# Patient Record
Sex: FEMALE | Race: WHITE | NOT HISPANIC OR LATINO | Employment: FULL TIME | URBAN - METROPOLITAN AREA
[De-identification: names, ages, dates, MRNs, and addresses within clinical notes are randomized per-mention and may not be internally consistent; named-entity substitution may affect disease eponyms.]

---

## 2023-08-30 ENCOUNTER — LAB (OUTPATIENT)
Dept: LAB | Facility: LAB | Age: 63
End: 2023-08-30
Payer: COMMERCIAL

## 2023-08-30 ENCOUNTER — OFFICE VISIT (OUTPATIENT)
Dept: PRIMARY CARE | Facility: CLINIC | Age: 63
End: 2023-08-30
Payer: COMMERCIAL

## 2023-08-30 VITALS
WEIGHT: 183.25 LBS | TEMPERATURE: 97.8 F | HEART RATE: 66 BPM | SYSTOLIC BLOOD PRESSURE: 138 MMHG | BODY MASS INDEX: 29.8 KG/M2 | DIASTOLIC BLOOD PRESSURE: 85 MMHG

## 2023-08-30 DIAGNOSIS — Z11.59 ENCOUNTER FOR HEPATITIS C SCREENING TEST FOR LOW RISK PATIENT: ICD-10-CM

## 2023-08-30 DIAGNOSIS — R91.1 PULMONARY NODULE: ICD-10-CM

## 2023-08-30 DIAGNOSIS — E78.49 OTHER HYPERLIPIDEMIA: ICD-10-CM

## 2023-08-30 DIAGNOSIS — I10 PRIMARY HYPERTENSION: ICD-10-CM

## 2023-08-30 DIAGNOSIS — Z00.00 ENCOUNTER FOR PREVENTATIVE ADULT HEALTH CARE EXAMINATION: Primary | ICD-10-CM

## 2023-08-30 DIAGNOSIS — K21.9 GASTROESOPHAGEAL REFLUX DISEASE WITHOUT ESOPHAGITIS: ICD-10-CM

## 2023-08-30 DIAGNOSIS — Z00.00 ENCOUNTER FOR PREVENTATIVE ADULT HEALTH CARE EXAMINATION: ICD-10-CM

## 2023-08-30 PROBLEM — E78.00 HYPERCHOLESTEREMIA: Status: ACTIVE | Noted: 2023-08-30

## 2023-08-30 LAB
ALANINE AMINOTRANSFERASE (SGPT) (U/L) IN SER/PLAS: 23 U/L (ref 7–45)
ALBUMIN (G/DL) IN SER/PLAS: 4.3 G/DL (ref 3.4–5)
ALKALINE PHOSPHATASE (U/L) IN SER/PLAS: 134 U/L (ref 33–136)
ANION GAP IN SER/PLAS: 13 MMOL/L (ref 10–20)
APPEARANCE, URINE: CLEAR
ASPARTATE AMINOTRANSFERASE (SGOT) (U/L) IN SER/PLAS: 19 U/L (ref 9–39)
BACTERIA, URINE: ABNORMAL /HPF
BASOPHILS (10*3/UL) IN BLOOD BY AUTOMATED COUNT: 0.06 X10E9/L (ref 0–0.1)
BASOPHILS/100 LEUKOCYTES IN BLOOD BY AUTOMATED COUNT: 0.5 % (ref 0–2)
BILIRUBIN TOTAL (MG/DL) IN SER/PLAS: 0.9 MG/DL (ref 0–1.2)
BILIRUBIN, URINE: NEGATIVE
BLOOD, URINE: NEGATIVE
CALCIDIOL (25 OH VITAMIN D3) (NG/ML) IN SER/PLAS: 27 NG/ML
CALCIUM (MG/DL) IN SER/PLAS: 9.3 MG/DL (ref 8.6–10.6)
CARBON DIOXIDE, TOTAL (MMOL/L) IN SER/PLAS: 29 MMOL/L (ref 21–32)
CHLORIDE (MMOL/L) IN SER/PLAS: 104 MMOL/L (ref 98–107)
CHOLESTEROL (MG/DL) IN SER/PLAS: 242 MG/DL (ref 0–199)
CHOLESTEROL IN HDL (MG/DL) IN SER/PLAS: 51.1 MG/DL
CHOLESTEROL/HDL RATIO: 4.7
COLOR, URINE: YELLOW
CREATININE (MG/DL) IN SER/PLAS: 0.72 MG/DL (ref 0.5–1.05)
EOSINOPHILS (10*3/UL) IN BLOOD BY AUTOMATED COUNT: 0.12 X10E9/L (ref 0–0.7)
EOSINOPHILS/100 LEUKOCYTES IN BLOOD BY AUTOMATED COUNT: 1 % (ref 0–6)
ERYTHROCYTE DISTRIBUTION WIDTH (RATIO) BY AUTOMATED COUNT: 12.2 % (ref 11.5–14.5)
ERYTHROCYTE MEAN CORPUSCULAR HEMOGLOBIN CONCENTRATION (G/DL) BY AUTOMATED: 32.6 G/DL (ref 32–36)
ERYTHROCYTE MEAN CORPUSCULAR VOLUME (FL) BY AUTOMATED COUNT: 100 FL (ref 80–100)
ERYTHROCYTES (10*6/UL) IN BLOOD BY AUTOMATED COUNT: 4.53 X10E12/L (ref 4–5.2)
ESTIMATED AVERAGE GLUCOSE FOR HBA1C: 108 MG/DL
GFR FEMALE: >90 ML/MIN/1.73M2
GLUCOSE (MG/DL) IN SER/PLAS: 92 MG/DL (ref 74–99)
GLUCOSE, URINE: NEGATIVE MG/DL
HEMATOCRIT (%) IN BLOOD BY AUTOMATED COUNT: 45.4 % (ref 36–46)
HEMOGLOBIN (G/DL) IN BLOOD: 14.8 G/DL (ref 12–16)
HEMOGLOBIN A1C/HEMOGLOBIN TOTAL IN BLOOD: 5.4 %
HEPATITIS C VIRUS AB PRESENCE IN SERUM: NONREACTIVE
IMMATURE GRANULOCYTES/100 LEUKOCYTES IN BLOOD BY AUTOMATED COUNT: 0.1 % (ref 0–0.9)
KETONES, URINE: NEGATIVE MG/DL
LDL: 153 MG/DL (ref 0–99)
LEUKOCYTE ESTERASE, URINE: ABNORMAL
LEUKOCYTES (10*3/UL) IN BLOOD BY AUTOMATED COUNT: 12.6 X10E9/L (ref 4.4–11.3)
LYMPHOCYTES (10*3/UL) IN BLOOD BY AUTOMATED COUNT: 9.21 X10E9/L (ref 1.2–4.8)
LYMPHOCYTES/100 LEUKOCYTES IN BLOOD BY AUTOMATED COUNT: 73.1 % (ref 13–44)
MONOCYTES (10*3/UL) IN BLOOD BY AUTOMATED COUNT: 0.41 X10E9/L (ref 0.1–1)
MONOCYTES/100 LEUKOCYTES IN BLOOD BY AUTOMATED COUNT: 3.3 % (ref 2–10)
MUCUS, URINE: ABNORMAL /LPF
NEUTROPHILS (10*3/UL) IN BLOOD BY AUTOMATED COUNT: 2.79 X10E9/L (ref 1.2–7.7)
NEUTROPHILS/100 LEUKOCYTES IN BLOOD BY AUTOMATED COUNT: 22 % (ref 40–80)
NITRITE, URINE: NEGATIVE
NRBC (PER 100 WBCS) BY AUTOMATED COUNT: 0.2 /100 WBC (ref 0–0)
PH, URINE: 8 (ref 5–8)
PLATELETS (10*3/UL) IN BLOOD AUTOMATED COUNT: 273 X10E9/L (ref 150–450)
POTASSIUM (MMOL/L) IN SER/PLAS: 4.1 MMOL/L (ref 3.5–5.3)
PROTEIN TOTAL: 6.5 G/DL (ref 6.4–8.2)
PROTEIN, URINE: NEGATIVE MG/DL
RBC, URINE: 1 /HPF (ref 0–5)
SODIUM (MMOL/L) IN SER/PLAS: 142 MMOL/L (ref 136–145)
SPECIFIC GRAVITY, URINE: 1.01 (ref 1–1.03)
SQUAMOUS EPITHELIAL CELLS, URINE: 1 /HPF
THYROTROPIN (MIU/L) IN SER/PLAS BY DETECTION LIMIT <= 0.05 MIU/L: 1.76 MIU/L (ref 0.44–3.98)
TRIGLYCERIDE (MG/DL) IN SER/PLAS: 190 MG/DL (ref 0–149)
UREA NITROGEN (MG/DL) IN SER/PLAS: 10 MG/DL (ref 6–23)
UROBILINOGEN, URINE: <2 MG/DL (ref 0–1.9)
VLDL: 38 MG/DL (ref 0–40)
WBC, URINE: 1 /HPF (ref 0–5)

## 2023-08-30 PROCEDURE — 36415 COLL VENOUS BLD VENIPUNCTURE: CPT

## 2023-08-30 PROCEDURE — 80053 COMPREHEN METABOLIC PANEL: CPT

## 2023-08-30 PROCEDURE — 84443 ASSAY THYROID STIM HORMONE: CPT

## 2023-08-30 PROCEDURE — 80061 LIPID PANEL: CPT

## 2023-08-30 PROCEDURE — 83036 HEMOGLOBIN GLYCOSYLATED A1C: CPT

## 2023-08-30 PROCEDURE — 83695 ASSAY OF LIPOPROTEIN(A): CPT

## 2023-08-30 PROCEDURE — 3075F SYST BP GE 130 - 139MM HG: CPT | Performed by: INTERNAL MEDICINE

## 2023-08-30 PROCEDURE — 3079F DIAST BP 80-89 MM HG: CPT | Performed by: INTERNAL MEDICINE

## 2023-08-30 PROCEDURE — 1036F TOBACCO NON-USER: CPT | Performed by: INTERNAL MEDICINE

## 2023-08-30 PROCEDURE — 82306 VITAMIN D 25 HYDROXY: CPT

## 2023-08-30 PROCEDURE — 85025 COMPLETE CBC W/AUTO DIFF WBC: CPT

## 2023-08-30 PROCEDURE — 86803 HEPATITIS C AB TEST: CPT

## 2023-08-30 PROCEDURE — 99396 PREV VISIT EST AGE 40-64: CPT | Performed by: INTERNAL MEDICINE

## 2023-08-30 PROCEDURE — 81001 URINALYSIS AUTO W/SCOPE: CPT

## 2023-08-30 PROCEDURE — 82172 ASSAY OF APOLIPOPROTEIN: CPT

## 2023-08-30 RX ORDER — CHOLECALCIFEROL (VITAMIN D3) 25 MCG
TABLET ORAL
COMMUNITY
End: 2023-09-02 | Stop reason: WASHOUT

## 2023-08-30 RX ORDER — OLMESARTAN MEDOXOMIL 20 MG/1
TABLET ORAL
COMMUNITY
Start: 2023-06-15 | End: 2023-09-06 | Stop reason: SDUPTHER

## 2023-08-30 RX ORDER — FAMOTIDINE 40 MG/1
40 TABLET, FILM COATED ORAL DAILY
COMMUNITY
End: 2023-09-02 | Stop reason: WASHOUT

## 2023-08-30 RX ORDER — MAGNESIUM 200 MG
TABLET ORAL
COMMUNITY
End: 2023-09-02 | Stop reason: WASHOUT

## 2023-08-30 RX ORDER — TRIAMCINOLONE ACETONIDE 1 MG/G
CREAM TOPICAL
COMMUNITY
Start: 2016-04-04 | End: 2023-09-02 | Stop reason: WASHOUT

## 2023-08-30 ASSESSMENT — PATIENT HEALTH QUESTIONNAIRE - PHQ9
SUM OF ALL RESPONSES TO PHQ9 QUESTIONS 1 & 2: 0
2. FEELING DOWN, DEPRESSED OR HOPELESS: NOT AT ALL
1. LITTLE INTEREST OR PLEASURE IN DOING THINGS: NOT AT ALL

## 2023-08-30 NOTE — PROGRESS NOTES
Subjective     Patient ID: Priya Izaguirre is a 63 y.o. female who presents for NEW PT VISIT.  HPI  63-year-old female new here for establishment of care and preventive care visit former patient of Dr. Akbar last seen in August of last year.     - Has been experiencing unexplained weight gain despite healthy lifestyle and no significant changes. Baseline weight around 170 pounds and has been gradually creeping up, she does fluctuate intermittently. At her lowest she was 158 pounds      Past medical history  -HTN -on olmesartan 20, previously on thiazide intolerant.   -HLD - CAC of 2 in 2019   -GERD- had EGD performed in December found hiatal hernia treated symptomatically   -Pulmonary nodules-completed 2-year follow-up  - Uterine fibroids - history of myomectomy 2001 and then grew back upcoming appt scheduled with GYN today   - Right meniscus repair in 2011 with mild weakness on the right   - Left congenital hearing loss - gets tested every 10 years declined hearing aid.     Social;   - Lives at home alone with dog.   - 3 sisters and a brother  - No tobacco, alcohol or drugs  - Works from home since 2001 does  - previously for [employer] now a different company    Lifestyle   - Diet - strict vegetarian since 2006 not too much junk foods, stays away from highly processed foods. Salads, no sodas or juices   - Exercise - [employer] [facility], did [facility] her spin classes.   - Sleep-regular uninterrupted 8 hours at least      Review of Systems:   General: No constitutional symptoms, weight changes as described  HEENT: No headaches, changes in vision or hearing, no sinus or dental issues   Cardiac: No chest pain, palpitations, dyspnea on exertion   Lungs: No cough, wheezing, shortness of breath   Abdomen: No abdominal pain, nausea/vomiting, diarrhea or constipation   : No urinary complaints   Musculoskeletal: no joint pains, swelling or tenderness   Heme: No bleeding or thrombosis issues   Lymph:  No swollen lymph glands   Skin: No rashes or lesions   Psych: No reported anxiety or depression     Objective       Current Outpatient Medications:     olmesartan (BENIcar) 20 mg tablet, , Disp: , Rfl:     triamcinolone (Kenalog) 0.1 % cream, 1 Application, Disp: , Rfl:     cholecalciferol (Vitamin D-3) 25 MCG (1000 UT) tablet, Take by mouth., Disp: , Rfl:     cyanocobalamin, vitamin B-12, 1,000 mcg tablet, sublingual, Place under the tongue., Disp: , Rfl:     famotidine (Pepcid) 40 mg tablet, Take 1 tablet (40 mg) by mouth once daily., Disp: , Rfl:       /85   Pulse 66   Temp 36.6 °C (97.8 °F)   Wt 83.1 kg (183 lb 4 oz)   BMI 29.80 kg/m²       Physical Examination:   General: Awake, alert, appears stated age   Head/eyes/ears: NCAT, EOMI, PERRL, TM WNL, no cerumen  Throat: moist mucus membranes, no pharyngeal erythema  Neck: Supple, nontender, no lymphadenopathy, thyroid exam unremarkable   Breast exam: declined  Heart: RRR, no murmurs, rubs or gallops  Lungs: CTA bilaterally, no rhonchi rales or wheezes   Abdomen: Soft, NT/ND  Extremities: No edema,  Skin: No concerning skin lesions on visualized skin, multiple nevi    Neuro: AAO x 3, no FND, gait unremarkable    Assessment/Plan   Problem List Items Addressed This Visit      Adult Health Examination  Age appropriate screening performed   Healthy lifestyle reviewed.   Depression screen   No additional pertinent family history or toxic habits   No high risk sexual behavior,   Cancer screening:   - Colonoscopy 12/22 repeat 5 years (history of polyps)   - Mammogram 8/22 due for repeat to be ordered by gyn has had cyst.   - Pap smear UTD follows at [facility]  - Skin cancer prevention strategies reviewed - follows with dermatology gets [facility].   Immunizations: Utd with Flu shot, COVID vaccine to be scheduled, Shingrix UTD,   Dental and visual examinations UTD   Discussed adequate Vitamin D intake      GERD (gastroesophageal reflux disease)     On as  needed famotidine, EGD unremarkable.         Other hyperlipidemia     With family history of ASCVD (not early), CAC of 2 in 2019, interested in further risk stratification. Will obtain Lp(a) and ApoB levels in addition to standard lipid profile, consider repeating CAC next year.          Relevant Orders    Lipoprotein A (LPA)    Apolipoprotein B    Pulmonary nodule     Subsequent work-up unremarkable.         Primary hypertension     Tolerating olmesartan well though with noted elevation of blood pressure reading today and on repeat.  Advised home blood pressure monitoring, will follow-up consider instituting amlodipine.  Intolerant to hydrochlorothiazide in the past.         Relevant Orders    Urinalysis with Reflex Microscopic     Other Visit Diagnoses       Encounter for preventative adult health care examination    -  Primary    Relevant Orders    CBC and Auto Differential    Comprehensive Metabolic Panel    Hemoglobin A1C    Lipid Panel    TSH with reflex to Free T4 if abnormal    Vitamin D 25-Hydroxy,Total (for eval of Vitamin D levels)    Urinalysis with Reflex Microscopic    Encounter for hepatitis C screening test for low risk patient        Relevant Orders    Hepatitis C antibody          Follow-up 1 year or as needed.  Will notify me regarding average blood pressure readings

## 2023-08-30 NOTE — ASSESSMENT & PLAN NOTE
With family history of ASCVD (not early), CAC of 2 in 2019, interested in further risk stratification. Will obtain Lp(a) and ApoB levels in addition to standard lipid profile, consider repeating CAC next year.

## 2023-08-30 NOTE — ASSESSMENT & PLAN NOTE
Tolerating olmesartan well though with noted elevation of blood pressure reading today and on repeat.  Advised home blood pressure monitoring, will follow-up consider instituting amlodipine.  Intolerant to hydrochlorothiazide in the past.

## 2023-08-30 NOTE — PATIENT INSTRUCTIONS
It was a pleasure to see you today! Here is a list of things we have discussed and to follow up on:    Flu shot, RSV vaccine, and COVID shots to be scheduled.   Blood pressure   Go to [email or web address]  I recommend you monitor your home blood pressure readings. To do this, be sure that the blood pressure cuff is on bare skin. Feet should be planted on the floor and back should be supported. Arm should be resting at the level of the heart. No talking to anyone during measurement and no caffeine within 30 minutes of checking blood pressure. Goal should be <130/80 on AVERAGE (outliers do not count).    Send me a message listing your average and we will determine next potential steps (we may consider starting a medication amlodipine).   I have ordered blood and/or urine tests for you to do today. The lab can be found on this floor (2nd floor) next to the pharmacy across from the elevators.    Followup in 1 year or as needed

## 2023-09-02 DIAGNOSIS — D72.829 LEUKOCYTOSIS, UNSPECIFIED TYPE: Primary | ICD-10-CM

## 2023-09-02 LAB — APOLIPOPROTEIN B: 136 MG/DL (ref 60–117)

## 2023-09-04 LAB — LIPOPROTEIN A: <6 MG/DL

## 2023-09-05 DIAGNOSIS — I10 PRIMARY HYPERTENSION: Primary | ICD-10-CM

## 2023-09-05 DIAGNOSIS — E78.49 OTHER HYPERLIPIDEMIA: ICD-10-CM

## 2023-09-06 ENCOUNTER — TELEPHONE (OUTPATIENT)
Dept: PRIMARY CARE | Facility: CLINIC | Age: 63
End: 2023-09-06

## 2023-09-06 RX ORDER — OLMESARTAN MEDOXOMIL 20 MG/1
TABLET ORAL
Qty: 90 TABLET | Refills: 1 | Status: SHIPPED | OUTPATIENT
Start: 2023-09-06 | End: 2024-02-29

## 2023-09-06 NOTE — TELEPHONE ENCOUNTER
Patient needs a refill on her Losartan 20 mg for a 90 day supply sent to the [facility]

## 2023-09-21 PROBLEM — J04.0 LARYNGITIS: Status: ACTIVE | Noted: 2023-09-21

## 2023-09-21 PROBLEM — M76.61 ACHILLES BURSITIS OF RIGHT LOWER EXTREMITY: Status: ACTIVE | Noted: 2023-09-21

## 2023-09-21 PROBLEM — M22.40 CHONDROMALACIA OF PATELLA: Status: ACTIVE | Noted: 2023-09-21

## 2023-09-21 PROBLEM — R05.3 CHRONIC COUGH: Status: ACTIVE | Noted: 2023-09-21

## 2023-09-21 PROBLEM — J31.0 CHRONIC RHINITIS: Status: ACTIVE | Noted: 2023-09-21

## 2023-09-21 PROBLEM — B35.1 ONYCHOMYCOSIS: Status: ACTIVE | Noted: 2023-09-21

## 2023-09-21 PROBLEM — E55.9 VITAMIN D DEFICIENCY: Status: ACTIVE | Noted: 2023-09-21

## 2023-09-21 RX ORDER — SODIUM FLUORIDE1.1%, POTASSIUM NITRATE 5% 5.8; 57.5 MG/ML; MG/ML
GEL, DENTIFRICE DENTAL
COMMUNITY
Start: 2023-08-14

## 2023-09-21 RX ORDER — FLUOCINOLONE ACETONIDE 0.11 MG/ML
OIL TOPICAL
COMMUNITY
Start: 2022-10-20 | End: 2023-11-16 | Stop reason: WASHOUT

## 2023-09-21 RX ORDER — OLMESARTAN MEDOXOMIL AND HYDROCHLOROTHIAZIDE 20/12.5 20; 12.5 MG/1; MG/1
1 TABLET ORAL DAILY
COMMUNITY
End: 2023-11-16 | Stop reason: WASHOUT

## 2023-09-21 RX ORDER — TRETINOIN 0.25 MG/G
CREAM TOPICAL
COMMUNITY
Start: 2021-03-24 | End: 2023-11-16 | Stop reason: WASHOUT

## 2023-09-25 ENCOUNTER — LAB (OUTPATIENT)
Dept: LAB | Facility: LAB | Age: 63
End: 2023-09-25
Payer: COMMERCIAL

## 2023-09-25 DIAGNOSIS — D72.829 LEUKOCYTOSIS, UNSPECIFIED TYPE: ICD-10-CM

## 2023-09-25 LAB
BASOPHILS (10*3/UL) IN BLOOD BY AUTOMATED COUNT: 0.07 X10E9/L (ref 0–0.1)
BASOPHILS/100 LEUKOCYTES IN BLOOD BY AUTOMATED COUNT: 0.5 % (ref 0–2)
EOSINOPHILS (10*3/UL) IN BLOOD BY AUTOMATED COUNT: 0.23 X10E9/L (ref 0–0.7)
EOSINOPHILS/100 LEUKOCYTES IN BLOOD BY AUTOMATED COUNT: 1.7 % (ref 0–6)
ERYTHROCYTE DISTRIBUTION WIDTH (RATIO) BY AUTOMATED COUNT: 12.1 % (ref 11.5–14.5)
ERYTHROCYTE MEAN CORPUSCULAR HEMOGLOBIN CONCENTRATION (G/DL) BY AUTOMATED: 31.7 G/DL (ref 32–36)
ERYTHROCYTE MEAN CORPUSCULAR VOLUME (FL) BY AUTOMATED COUNT: 101 FL (ref 80–100)
ERYTHROCYTES (10*6/UL) IN BLOOD BY AUTOMATED COUNT: 4.51 X10E12/L (ref 4–5.2)
HEMATOCRIT (%) IN BLOOD BY AUTOMATED COUNT: 45.7 % (ref 36–46)
HEMOGLOBIN (G/DL) IN BLOOD: 14.5 G/DL (ref 12–16)
IMMATURE GRANULOCYTES/100 LEUKOCYTES IN BLOOD BY AUTOMATED COUNT: 0.1 % (ref 0–0.9)
LEUKOCYTES (10*3/UL) IN BLOOD BY AUTOMATED COUNT: 13.7 X10E9/L (ref 4.4–11.3)
LYMPHOCYTES (10*3/UL) IN BLOOD BY AUTOMATED COUNT: 10 X10E9/L (ref 1.2–4.8)
LYMPHOCYTES/100 LEUKOCYTES IN BLOOD BY AUTOMATED COUNT: 73 % (ref 13–44)
MONOCYTES (10*3/UL) IN BLOOD BY AUTOMATED COUNT: 0.65 X10E9/L (ref 0.1–1)
MONOCYTES/100 LEUKOCYTES IN BLOOD BY AUTOMATED COUNT: 4.7 % (ref 2–10)
NEUTROPHILS (10*3/UL) IN BLOOD BY AUTOMATED COUNT: 2.72 X10E9/L (ref 1.2–7.7)
NEUTROPHILS/100 LEUKOCYTES IN BLOOD BY AUTOMATED COUNT: 20 % (ref 40–80)
NRBC (PER 100 WBCS) BY AUTOMATED COUNT: 0.2 /100 WBC (ref 0–0)
PLATELETS (10*3/UL) IN BLOOD AUTOMATED COUNT: 203 X10E9/L (ref 150–450)

## 2023-09-25 PROCEDURE — 36415 COLL VENOUS BLD VENIPUNCTURE: CPT

## 2023-09-25 PROCEDURE — 85025 COMPLETE CBC W/AUTO DIFF WBC: CPT

## 2023-09-27 DIAGNOSIS — D72.820 LYMPHOCYTOSIS: Primary | ICD-10-CM

## 2023-09-28 DIAGNOSIS — D72.820 LYMPHOCYTOSIS: Primary | ICD-10-CM

## 2023-10-17 ENCOUNTER — APPOINTMENT (OUTPATIENT)
Dept: HEMATOLOGY/ONCOLOGY | Facility: CLINIC | Age: 63
End: 2023-10-17
Payer: COMMERCIAL

## 2023-10-24 ENCOUNTER — LAB (OUTPATIENT)
Dept: LAB | Facility: LAB | Age: 63
End: 2023-10-24
Payer: COMMERCIAL

## 2023-10-24 DIAGNOSIS — D72.820 LYMPHOCYTOSIS: ICD-10-CM

## 2023-10-24 LAB
BASOPHILS # BLD MANUAL: 0.13 X10*3/UL (ref 0–0.1)
BASOPHILS NFR BLD MANUAL: 0.9 %
CRP SERPL-MCNC: 0.19 MG/DL
EOSINOPHIL # BLD MANUAL: 0.37 X10*3/UL (ref 0–0.7)
EOSINOPHIL NFR BLD MANUAL: 2.6 %
ERYTHROCYTE [DISTWIDTH] IN BLOOD BY AUTOMATED COUNT: 11.9 % (ref 11.5–14.5)
ERYTHROCYTE [SEDIMENTATION RATE] IN BLOOD BY WESTERGREN METHOD: 10 MM/H (ref 0–30)
HCT VFR BLD AUTO: 46 % (ref 36–46)
HGB BLD-MCNC: 14.4 G/DL (ref 12–16)
IMM GRANULOCYTES # BLD AUTO: 0.01 X10*3/UL (ref 0–0.7)
IMM GRANULOCYTES NFR BLD AUTO: 0.1 % (ref 0–0.9)
LYMPHOCYTES # BLD MANUAL: 8.91 X10*3/UL (ref 1.2–4.8)
LYMPHOCYTES NFR BLD MANUAL: 63.2 %
MCH RBC QN AUTO: 32.5 PG (ref 26–34)
MCHC RBC AUTO-ENTMCNC: 31.3 G/DL (ref 32–36)
MCV RBC AUTO: 104 FL (ref 80–100)
MONOCYTES # BLD MANUAL: 0.24 X10*3/UL (ref 0.1–1)
MONOCYTES NFR BLD MANUAL: 1.7 %
NEUTS SEG # BLD MANUAL: 4.46 X10*3/UL (ref 1.2–7)
NEUTS SEG NFR BLD MANUAL: 31.6 %
NRBC BLD-RTO: 0 /100 WBCS (ref 0–0)
PLATELET # BLD AUTO: 275 X10*3/UL (ref 150–450)
PMV BLD AUTO: 10.5 FL (ref 7.5–11.5)
RBC # BLD AUTO: 4.43 X10*6/UL (ref 4–5.2)
RBC MORPH BLD: ABNORMAL
TOTAL CELLS COUNTED BLD: 114
WBC # BLD AUTO: 14.1 X10*3/UL (ref 4.4–11.3)

## 2023-10-24 PROCEDURE — 85652 RBC SED RATE AUTOMATED: CPT

## 2023-10-24 PROCEDURE — 85060 BLOOD SMEAR INTERPRETATION: CPT | Performed by: INTERNAL MEDICINE

## 2023-10-24 PROCEDURE — 36415 COLL VENOUS BLD VENIPUNCTURE: CPT

## 2023-10-24 PROCEDURE — 85007 BL SMEAR W/DIFF WBC COUNT: CPT

## 2023-10-24 PROCEDURE — 85027 COMPLETE CBC AUTOMATED: CPT

## 2023-10-24 PROCEDURE — 86140 C-REACTIVE PROTEIN: CPT

## 2023-10-25 LAB — PATH REVIEW-CBC DIFFERENTIAL: NORMAL

## 2023-10-26 DIAGNOSIS — D72.820 LYMPHOCYTOSIS: Primary | ICD-10-CM

## 2023-10-28 DIAGNOSIS — D72.820 LYMPHOCYTOSIS: Primary | ICD-10-CM

## 2023-10-30 ENCOUNTER — LAB (OUTPATIENT)
Dept: LAB | Facility: LAB | Age: 63
End: 2023-10-30
Payer: COMMERCIAL

## 2023-10-30 DIAGNOSIS — D72.820 LYMPHOCYTOSIS: ICD-10-CM

## 2023-10-30 LAB
BASOPHILS # BLD AUTO: 0.07 X10*3/UL (ref 0–0.1)
BASOPHILS NFR BLD AUTO: 0.5 %
EOSINOPHIL # BLD AUTO: 0.15 X10*3/UL (ref 0–0.7)
EOSINOPHIL NFR BLD AUTO: 1.1 %
ERYTHROCYTE [DISTWIDTH] IN BLOOD BY AUTOMATED COUNT: 11.9 % (ref 11.5–14.5)
HCT VFR BLD AUTO: 45.5 % (ref 36–46)
HGB BLD-MCNC: 15.1 G/DL (ref 12–16)
IMM GRANULOCYTES # BLD AUTO: 0.02 X10*3/UL (ref 0–0.7)
IMM GRANULOCYTES NFR BLD AUTO: 0.1 % (ref 0–0.9)
LYMPHOCYTES # BLD AUTO: 9.45 X10*3/UL (ref 1.2–4.8)
LYMPHOCYTES NFR BLD AUTO: 67.8 %
MCH RBC QN AUTO: 33 PG (ref 26–34)
MCHC RBC AUTO-ENTMCNC: 33.2 G/DL (ref 32–36)
MCV RBC AUTO: 100 FL (ref 80–100)
MONOCYTES # BLD AUTO: 0.63 X10*3/UL (ref 0.1–1)
MONOCYTES NFR BLD AUTO: 4.5 %
NEUTROPHILS # BLD AUTO: 3.61 X10*3/UL (ref 1.2–7.7)
NEUTROPHILS NFR BLD AUTO: 26 %
NRBC BLD-RTO: 0 /100 WBCS (ref 0–0)
PLATELET # BLD AUTO: 270 X10*3/UL (ref 150–450)
PMV BLD AUTO: 10.1 FL (ref 7.5–11.5)
RBC # BLD AUTO: 4.57 X10*6/UL (ref 4–5.2)
WBC # BLD AUTO: 13.9 X10*3/UL (ref 4.4–11.3)

## 2023-10-30 PROCEDURE — 81263 IGH VARI REGIONAL MUTATION: CPT

## 2023-10-30 PROCEDURE — 81450 HL NEO GSAP 5-50DNA/DNA&RNA: CPT

## 2023-10-30 PROCEDURE — 36415 COLL VENOUS BLD VENIPUNCTURE: CPT

## 2023-10-30 PROCEDURE — 88275 CYTOGENETICS 100-300: CPT

## 2023-10-30 PROCEDURE — 88185 FLOWCYTOMETRY/TC ADD-ON: CPT

## 2023-10-30 PROCEDURE — 88237 TISSUE CULTURE BONE MARROW: CPT

## 2023-10-30 PROCEDURE — 88184 FLOWCYTOMETRY/ TC 1 MARKER: CPT

## 2023-10-30 PROCEDURE — G0452 MOLECULAR PATHOLOGY INTERPR: HCPCS | Performed by: INTERNAL MEDICINE

## 2023-10-30 PROCEDURE — 85025 COMPLETE CBC W/AUTO DIFF WBC: CPT

## 2023-10-30 PROCEDURE — 88291 CYTO/MOLECULAR REPORT: CPT | Performed by: INTERNAL MEDICINE

## 2023-10-30 PROCEDURE — 88271 CYTOGENETICS DNA PROBE: CPT

## 2023-10-30 PROCEDURE — 88189 FLOWCYTOMETRY/READ 16 & >: CPT

## 2023-11-02 LAB
CELL COUNT (BLOOD): 13.9 X10*3/UL
CELL POPULATIONS: NORMAL
CYTOGENETICS/MOLECULAR TEST ORDERED: NORMAL
DIAGNOSIS: NORMAL
FLOW DIFFERENTIAL: NORMAL
FLOW TEST ORDERED: NORMAL
LAB TEST METHOD: NORMAL
NUMBER OF CELLS COLLECTED: NORMAL PER TUBE
PATH REPORT.TOTAL CANCER: NORMAL
RBC MORPH BLD: NORMAL
SIGNATURE COMMENT: NORMAL
SPECIMEN VIABILITY: NORMAL
WBC MORPH BLD: NORMAL

## 2023-11-07 ENCOUNTER — APPOINTMENT (OUTPATIENT)
Dept: HEMATOLOGY/ONCOLOGY | Facility: CLINIC | Age: 63
End: 2023-11-07
Payer: COMMERCIAL

## 2023-11-08 LAB
CHROM ANALY OVERALL INTERP-IMP: NORMAL
ELECTRONICALLY SIGNED BY CYTOGENETICS: NORMAL
ELECTRONICALLY SIGNED BY: NORMAL
LYMPHOID NGS RESULTS: NORMAL

## 2023-11-09 ASSESSMENT — PATIENT HEALTH QUESTIONNAIRE - PHQ9
3. TROUBLE FALLING OR STAYING ASLEEP OR SLEEPING TOO MUCH: 0
2. FEELING DOWN, DEPRESSED, IRRITABLE, OR HOPELESS: NOT AT ALL
8. MOVING OR SPEAKING SO SLOWLY THAT OTHER PEOPLE COULD HAVE NOTICED. OR THE OPPOSITE, BEING SO FIGETY OR RESTLESS THAT YOU HAVE BEEN MOVING AROUND A LOT MORE THAN USUAL: NOT AT ALL
9. THOUGHTS THAT YOU WOULD BE BETTER OFF DEAD, OR OF HURTING YOURSELF: 0
7. TROUBLE CONCENTRATING ON THINGS, SUCH AS READING THE NEWSPAPER OR WATCHING TELEVISION: NOT AT ALL
SUM OF ALL RESPONSES TO PHQ QUESTIONS 1-9: 1
4. FEELING TIRED OR HAVING LITTLE ENERGY: SEVERAL DAYS
6. FEELING BAD ABOUT YOURSELF - OR THAT YOU ARE A FAILURE OR HAVE LET YOURSELF OR YOUR FAMILY DOWN: 0
1. LITTLE INTEREST OR PLEASURE IN DOING THINGS: NOT AT ALL
8. MOVING OR SPEAKING SO SLOWLY THAT OTHER PEOPLE COULD HAVE NOTICED. OR THE OPPOSITE, BEING SO FIGETY OR RESTLESS THAT YOU HAVE BEEN MOVING AROUND A LOT MORE THAN USUAL: NOT AT ALL
6. FEELING BAD ABOUT YOURSELF - OR THAT YOU ARE A FAILURE OR HAVE LET YOURSELF OR YOUR FAMILY DOWN: NOT AT ALL
5. POOR APPETITE OR OVEREATING: 0
10. IF YOU CHECKED OFF ANY PROBLEMS, HOW DIFFICULT HAVE THESE PROBLEMS MADE IT FOR YOU TO DO YOUR WORK, TAKE CARE OF THINGS AT HOME, OR GET ALONG WITH OTHER PEOPLE: NOT DIFFICULT AT ALL
8. MOVING OR SPEAKING SO SLOWLY THAT OTHER PEOPLE COULD HAVE NOTICED. OR THE OPPOSITE, BEING SO FIGETY OR RESTLESS THAT YOU HAVE BEEN MOVING AROUND A LOT MORE THAN USUAL: 0
2. FEELING DOWN, DEPRESSED OR HOPELESS: NOT AT ALL
7. TROUBLE CONCENTRATING ON THINGS, SUCH AS READING THE NEWSPAPER OR WATCHING TELEVISION: 0
9. THOUGHTS THAT YOU WOULD BE BETTER OFF DEAD, OR OF HURTING YOURSELF: NOT AT ALL
9. THOUGHTS THAT YOU WOULD BE BETTER OFF DEAD, OR OF HURTING YOURSELF: NOT AT ALL
5. POOR APPETITE OR OVEREATING: NOT AT ALL
10. IF YOU CHECKED OFF ANY PROBLEMS, HOW DIFFICULT HAVE THESE PROBLEMS MADE IT FOR YOU TO DO YOUR WORK, TAKE CARE OF THINGS AT HOME, OR GET ALONG WITH OTHER PEOPLE: NOT DIFFICULT AT ALL
3. TROUBLE FALLING OR STAYING ASLEEP OR SLEEPING TOO MUCH: NOT AT ALL
4. FEELING TIRED OR HAVING LITTLE ENERGY: 1
2. FEELING DOWN, DEPRESSED, IRRITABLE, OR HOPELESS: 0
4. FEELING TIRED OR HAVING LITTLE ENERGY: SEVERAL DAYS
1. LITTLE INTEREST OR PLEASURE IN DOING THINGS: NOT AT ALL
5. POOR APPETITE OR OVEREATING: NOT AT ALL
SUM OF ALL RESPONSES TO PHQ QUESTIONS 1-9: 1
7. TROUBLE CONCENTRATING ON THINGS, SUCH AS READING THE NEWSPAPER OR WATCHING TELEVISION: NOT AT ALL
6. FEELING BAD ABOUT YOURSELF - OR THAT YOU ARE A FAILURE OR HAVE LET YOURSELF OR YOUR FAMILY DOWN: NOT AT ALL
3. TROUBLE FALLING OR STAYING ASLEEP OR SLEEPING TOO MUCH: NOT AT ALL
1. LITTLE INTEREST OR PLEASURE IN DOING THINGS: 0

## 2023-11-10 LAB
ELECTRONICALLY SIGNED BY: NORMAL
IGHV RESULTS: NORMAL

## 2023-11-13 LAB
CHROM ANALY OVERALL INTERP-IMP: NORMAL
ELECTRONICALLY COSIGNED BY CYTOGENETICS: NORMAL
ELECTRONICALLY SIGNED BY CYTOGENETICS: NORMAL
STRUCT VAR ISCN NAME: NORMAL

## 2023-11-16 ENCOUNTER — OFFICE VISIT (OUTPATIENT)
Dept: HEMATOLOGY/ONCOLOGY | Facility: HOSPITAL | Age: 63
End: 2023-11-16
Payer: COMMERCIAL

## 2023-11-16 ENCOUNTER — LAB (OUTPATIENT)
Dept: LAB | Facility: HOSPITAL | Age: 63
End: 2023-11-16
Payer: COMMERCIAL

## 2023-11-16 VITALS
OXYGEN SATURATION: 100 % | HEART RATE: 78 BPM | SYSTOLIC BLOOD PRESSURE: 142 MMHG | RESPIRATION RATE: 16 BRPM | BODY MASS INDEX: 30.56 KG/M2 | WEIGHT: 186.51 LBS | TEMPERATURE: 98.4 F | DIASTOLIC BLOOD PRESSURE: 83 MMHG

## 2023-11-16 DIAGNOSIS — D72.820 LYMPHOCYTOSIS: ICD-10-CM

## 2023-11-16 DIAGNOSIS — Z00.00 REGULAR CHECK-UP: Primary | ICD-10-CM

## 2023-11-16 DIAGNOSIS — C91.10 CLL (CHRONIC LYMPHOCYTIC LEUKEMIA) (MULTI): ICD-10-CM

## 2023-11-16 DIAGNOSIS — C91.10 CLL (CHRONIC LYMPHOCYTIC LEUKEMIA) (MULTI): Primary | ICD-10-CM

## 2023-11-16 LAB
CREAT SERPL-MCNC: 0.74 MG/DL (ref 0.5–1.05)
GFR SERPL CREATININE-BSD FRML MDRD: >90 ML/MIN/1.73M*2
HOLD SPECIMEN: NORMAL

## 2023-11-16 PROCEDURE — 3079F DIAST BP 80-89 MM HG: CPT | Performed by: INTERNAL MEDICINE

## 2023-11-16 PROCEDURE — 36415 COLL VENOUS BLD VENIPUNCTURE: CPT

## 2023-11-16 PROCEDURE — 1036F TOBACCO NON-USER: CPT | Performed by: INTERNAL MEDICINE

## 2023-11-16 PROCEDURE — 3077F SYST BP >= 140 MM HG: CPT | Performed by: INTERNAL MEDICINE

## 2023-11-16 PROCEDURE — 99215 OFFICE O/P EST HI 40 MIN: CPT | Performed by: INTERNAL MEDICINE

## 2023-11-16 PROCEDURE — 82565 ASSAY OF CREATININE: CPT

## 2023-11-16 PROCEDURE — 99205 OFFICE O/P NEW HI 60 MIN: CPT | Performed by: INTERNAL MEDICINE

## 2023-11-16 ASSESSMENT — PAIN SCALES - GENERAL: PAINLEVEL: 0-NO PAIN

## 2023-11-16 NOTE — PROGRESS NOTES
Patient ID: Priya Izaguirre is a 63 y.o. female.    Diagnosis:   Problem List Items Addressed This Visit          Malignant Neoplasms    CLL (chronic lymphocytic leukemia) (CMS/HCC) - Primary    Relevant Orders    Clinic Appointment Request Follow Up; [facility]    CT abdomen pelvis w IV contrast    Creatinine, Serum (Completed)     Other Visit Diagnoses       Lymphocytosis        Relevant Orders    Clinic Appointment Request Follow Up; [facility]             Treatment:   Oncology History Overview Note   8/30/23 Noticed to have high WBC with ALC 9.2  10/30/23 ALC 10, HGB 14.5,     Flow cytometry:  CD5 positive B-cell population with an immunophenotype that is most suggestive of chronic lymphocytic leukemia/small lymphocytic lymphoma; the frequency of the cells is slightly less than 5x109 and likely representing an evolving CLL/SLL.     FISH analysis showed NORMAL results with no evidence of RONNELL (11q), translocation 11;14- IGH/CCND1, trisomy 12, 13q14 deletion, monosomy 13 and TP53 (17p) deletion.    IgHV:  Clone 1: Sequence identity: 96.43% (IGHV and IGHJ reference alleles: [id number], [id number])  Clone 2: Sequence identity: 100.0% (IGHV and IGHJ reference alleles: [id number], [id number])  Current guidelines favor managing these patients as unmutated-CLL (Leukemia 2022; 36:4925-5093)     NGS  NOTCH1 [id number], VAF: 19%  Negative for TP53 mutation.  Negative for RONNELL mutation.     CLL (chronic lymphocytic leukemia) (CMS/HCC)   11/16/2023 Initial Diagnosis    CLL (chronic lymphocytic leukemia) (CMS/HCC)         Response:     Past Medical History:     Past Medical History:   Diagnosis Date    HL (hearing loss)     Hypertension     Infection following a procedure, other surgical site, initial encounter 06/24/2020    Cellulitis, wound, post-operative       Surgical History:     Past Surgical History:   Procedure Laterality Date    KNEE SURGERY Right 2011    Meniscus repair Dr. Stevenson    MYOMECTOMY  2001    TOE  SURGERY  06/2020    allergic to nickel from the pin    WISDOM TOOTH EXTRACTION          Family History:     Family History   Problem Relation Name Age of Onset    Hearing loss Mother Andie     Stroke Mother Andie 70    Diabetes Father Parish     Heart disease Father Parish 65        ischemic    Hypertension Father Parish     Hearing loss Father Parish     Thyroid cancer Sister      No Known Problems Sister      No Known Problems Sister      No Known Problems Brother      Vision loss Maternal Grandfather Ed        Social History:    Single, no children, never smoker , occasional alcohol, retired  for [employer] complan[employer] from [employer]     Social History     Tobacco Use    Smoking status: Never     Passive exposure: Never    Smokeless tobacco: Never   Substance Use Topics    Alcohol use: Never    Drug use: Never      -------------------------------------------------------------------------------------------------------  Subjective       Ms Izaguirre is a 63 years old with h/o HTN, who on routine follow up she was found to have leukocytosis with lymphocytic predominence in Aug 2023, after proof to be persistent further evaluation was done as descrbed above.  The patient remains asymptomatic. She denies any fevers, night sweats or weight loss. She's been having hot flashes sometimes, but doesn't get fever. She states having chronic abdominal pain, for which she was evaluated in the past with EGD that showed hiatal hernia and she was also told she had fibroids. She never felt any lymph node enlargement, no skin rash.          Review of Systems   Constitutional: Negative.  Negative for appetite change, chills, diaphoresis, fatigue, fever and unexpected weight change.   HENT:  Negative.     Eyes: Negative.    Respiratory: Negative.     Cardiovascular: Negative.    Gastrointestinal:  Positive for abdominal pain. Negative for abdominal distention, blood in stool, constipation, diarrhea, nausea, rectal pain and vomiting.    Genitourinary: Negative.     Musculoskeletal: Negative.    Skin: Negative.    Neurological: Negative.    Hematological: Negative.    Psychiatric/Behavioral: Negative.        -------------------------------------------------------------------------------------------------------  Objective   BSA: 1.98 meters squared  /83   Pulse 78   Temp 36.9 °C (98.4 °F)   Resp 16   Wt 84.6 kg (186 lb 8.2 oz)   SpO2 100%   BMI 30.56 kg/m²     Physical Exam  Constitutional:       Appearance: Normal appearance. She is normal weight.   HENT:      Head: Normocephalic.      Nose: Nose normal.      Mouth/Throat:      Mouth: Mucous membranes are moist.   Eyes:      Conjunctiva/sclera: Conjunctivae normal.      Pupils: Pupils are equal, round, and reactive to light.   Neck:      Vascular: No carotid bruit.   Cardiovascular:      Rate and Rhythm: Normal rate and regular rhythm.   Pulmonary:      Effort: Pulmonary effort is normal.      Breath sounds: Normal breath sounds.   Abdominal:      General: Abdomen is flat.      Palpations: Abdomen is soft. There is no mass.      Tenderness: There is no guarding or rebound.   Musculoskeletal:         General: No swelling or tenderness. Normal range of motion.      Cervical back: Normal range of motion and neck supple.   Lymphadenopathy:      Cervical: No cervical adenopathy.   Skin:     General: Skin is warm.   Neurological:      General: No focal deficit present.      Mental Status: She is alert and oriented to person, place, and time.   Psychiatric:         Mood and Affect: Mood normal.         Performance Status:  Asymptomatic  -------------------------------------------------------------------------------------------------------  Assessment/Plan    63 years old, female with history of hypertension, was incidentally found to have leukocytosis with lymphocytic predominance in August 2023. further evaluation confirmed the diagnosis of CLL. FISH normal and IgHV unmutated. NGS with only  NOTCH1 mutation. Patient has been asymptomatic with no palpable lymphadenopathy, however, since she's been having recurrent abdominal pain, evaluation for intraabdominal lymphadenopathy is warranted.  So far considered at SAHA 0, IPS-E 1, intermediate risk with 5 years cumulative risk for starting treatment of 28.4%.    Plan  - CT A/P to check for possible lymphadenopathy  - observation    RTC 3 months with repeat labs and CT A/P    Patient seen and examined with Dr. Escobedo for newly diagnosed apparently stage 0 CLL, only adverse factor is unmutated IgH gene rearrangement. Her PCP, Dr. Aquino did a very through evaluation prior to our meeting today No lymphadenopathy on exam and will complete evaluation with abdominal CT scan due to abdomenal pain which I suspect is GERD. I reviewed the generally indolent nature of CLL with the patient as well as indications to start treatment.  As above using IPS-E scal 25% chance of requiring treatment in 5 years, which are generally biologic medications.      RTC 3 months with lab work prior and CT abdomen and pelvis ordered.      -------------------------------------------------------------------------------------------------------  Caitlyn Keys MD

## 2023-11-17 ASSESSMENT — ENCOUNTER SYMPTOMS
CARDIOVASCULAR NEGATIVE: 1
VOMITING: 0
DIAPHORESIS: 0
FEVER: 0
UNEXPECTED WEIGHT CHANGE: 0
CONSTIPATION: 0
ABDOMINAL PAIN: 1
APPETITE CHANGE: 0
BLOOD IN STOOL: 0
ABDOMINAL DISTENTION: 0
NEUROLOGICAL NEGATIVE: 1
NAUSEA: 0
RECTAL PAIN: 0
PSYCHIATRIC NEGATIVE: 1
FATIGUE: 0
RESPIRATORY NEGATIVE: 1
MUSCULOSKELETAL NEGATIVE: 1
EYES NEGATIVE: 1
CHILLS: 0
CONSTITUTIONAL NEGATIVE: 1
DIARRHEA: 0
HEMATOLOGIC/LYMPHATIC NEGATIVE: 1

## 2023-12-18 ENCOUNTER — HOSPITAL ENCOUNTER (OUTPATIENT)
Dept: RADIOLOGY | Facility: HOSPITAL | Age: 63
Discharge: HOME | End: 2023-12-18
Payer: COMMERCIAL

## 2023-12-18 ENCOUNTER — LAB (OUTPATIENT)
Dept: LAB | Facility: LAB | Age: 63
End: 2023-12-18
Payer: COMMERCIAL

## 2023-12-18 DIAGNOSIS — C91.10 CLL (CHRONIC LYMPHOCYTIC LEUKEMIA) (MULTI): ICD-10-CM

## 2023-12-18 LAB
ALBUMIN SERPL BCP-MCNC: 4 G/DL (ref 3.4–5)
ALP SERPL-CCNC: 121 U/L (ref 33–136)
ALT SERPL W P-5'-P-CCNC: 19 U/L (ref 7–45)
ANION GAP SERPL CALC-SCNC: 11 MMOL/L (ref 10–20)
AST SERPL W P-5'-P-CCNC: 20 U/L (ref 9–39)
B2 MICROGLOB SERPL-MCNC: 2.2 MG/L (ref 0.7–2.2)
BASOPHILS # BLD AUTO: 0.09 X10*3/UL (ref 0–0.1)
BASOPHILS NFR BLD AUTO: 0.6 %
BILIRUB SERPL-MCNC: 0.8 MG/DL (ref 0–1.2)
BUN SERPL-MCNC: 11 MG/DL (ref 6–23)
CALCIUM SERPL-MCNC: 8.6 MG/DL (ref 8.6–10.3)
CHLORIDE SERPL-SCNC: 102 MMOL/L (ref 98–107)
CO2 SERPL-SCNC: 28 MMOL/L (ref 21–32)
CREAT SERPL-MCNC: 0.5 MG/DL (ref 0.6–1.3)
CREAT SERPL-MCNC: 0.69 MG/DL (ref 0.5–1.05)
EOSINOPHIL # BLD AUTO: 0.16 X10*3/UL (ref 0–0.7)
EOSINOPHIL NFR BLD AUTO: 1 %
ERYTHROCYTE [DISTWIDTH] IN BLOOD BY AUTOMATED COUNT: 12.1 % (ref 11.5–14.5)
GFR SERPL CREATININE-BSD FRML MDRD: >90 ML/MIN/1.73M*2
GFR SERPL CREATININE-BSD FRML MDRD: >90 ML/MIN/1.73M*2
GLUCOSE SERPL-MCNC: 88 MG/DL (ref 74–99)
HCT VFR BLD AUTO: 43 % (ref 36–46)
HGB BLD-MCNC: 14.1 G/DL (ref 12–16)
IMM GRANULOCYTES # BLD AUTO: 0.03 X10*3/UL (ref 0–0.7)
IMM GRANULOCYTES NFR BLD AUTO: 0.2 % (ref 0–0.9)
LDH SERPL L TO P-CCNC: 156 U/L (ref 84–246)
LYMPHOCYTES # BLD AUTO: 11.51 X10*3/UL (ref 1.2–4.8)
LYMPHOCYTES NFR BLD AUTO: 74.8 %
MCH RBC QN AUTO: 32.3 PG (ref 26–34)
MCHC RBC AUTO-ENTMCNC: 32.8 G/DL (ref 32–36)
MCV RBC AUTO: 98 FL (ref 80–100)
MONOCYTES # BLD AUTO: 0.53 X10*3/UL (ref 0.1–1)
MONOCYTES NFR BLD AUTO: 3.4 %
NEUTROPHILS # BLD AUTO: 3.07 X10*3/UL (ref 1.2–7.7)
NEUTROPHILS NFR BLD AUTO: 20 %
NRBC BLD-RTO: 0 /100 WBCS (ref 0–0)
PLATELET # BLD AUTO: 284 X10*3/UL (ref 150–450)
POTASSIUM SERPL-SCNC: 3.9 MMOL/L (ref 3.5–5.3)
PROT SERPL-MCNC: 6.1 G/DL (ref 6.4–8.2)
RBC # BLD AUTO: 4.37 X10*6/UL (ref 4–5.2)
SODIUM SERPL-SCNC: 137 MMOL/L (ref 136–145)
WBC # BLD AUTO: 15.4 X10*3/UL (ref 4.4–11.3)

## 2023-12-18 PROCEDURE — 80053 COMPREHEN METABOLIC PANEL: CPT

## 2023-12-18 PROCEDURE — 85025 COMPLETE CBC W/AUTO DIFF WBC: CPT

## 2023-12-18 PROCEDURE — 82565 ASSAY OF CREATININE: CPT

## 2023-12-18 PROCEDURE — 2550000001 HC RX 255 CONTRASTS: Performed by: INTERNAL MEDICINE

## 2023-12-18 PROCEDURE — 36415 COLL VENOUS BLD VENIPUNCTURE: CPT

## 2023-12-18 PROCEDURE — 74177 CT ABD & PELVIS W/CONTRAST: CPT | Performed by: RADIOLOGY

## 2023-12-18 PROCEDURE — 74177 CT ABD & PELVIS W/CONTRAST: CPT

## 2023-12-18 PROCEDURE — 83615 LACTATE (LD) (LDH) ENZYME: CPT

## 2023-12-18 PROCEDURE — 82232 ASSAY OF BETA-2 PROTEIN: CPT

## 2023-12-18 RX ADMIN — IOHEXOL 75 ML: 350 INJECTION, SOLUTION INTRAVENOUS at 08:50

## 2024-01-22 ENCOUNTER — LAB (OUTPATIENT)
Dept: LAB | Facility: LAB | Age: 64
End: 2024-01-22
Payer: COMMERCIAL

## 2024-01-22 DIAGNOSIS — C91.10 CLL (CHRONIC LYMPHOCYTIC LEUKEMIA) (MULTI): ICD-10-CM

## 2024-01-22 LAB
ALBUMIN SERPL BCP-MCNC: 4 G/DL (ref 3.4–5)
ALP SERPL-CCNC: 117 U/L (ref 33–136)
ALT SERPL W P-5'-P-CCNC: 17 U/L (ref 7–45)
ANION GAP SERPL CALC-SCNC: 11 MMOL/L (ref 10–20)
AST SERPL W P-5'-P-CCNC: 18 U/L (ref 9–39)
B2 MICROGLOB SERPL-MCNC: 2.1 MG/L (ref 0.7–2.2)
BASOPHILS # BLD MANUAL: 0.28 X10*3/UL (ref 0–0.1)
BASOPHILS NFR BLD MANUAL: 2 %
BILIRUB SERPL-MCNC: 0.7 MG/DL (ref 0–1.2)
BUN SERPL-MCNC: 10 MG/DL (ref 6–23)
CALCIUM SERPL-MCNC: 8.8 MG/DL (ref 8.6–10.3)
CHLORIDE SERPL-SCNC: 104 MMOL/L (ref 98–107)
CO2 SERPL-SCNC: 30 MMOL/L (ref 21–32)
CREAT SERPL-MCNC: 0.76 MG/DL (ref 0.5–1.05)
EGFRCR SERPLBLD CKD-EPI 2021: 89 ML/MIN/1.73M*2
EOSINOPHIL # BLD MANUAL: 0.28 X10*3/UL (ref 0–0.7)
EOSINOPHIL NFR BLD MANUAL: 2 %
ERYTHROCYTE [DISTWIDTH] IN BLOOD BY AUTOMATED COUNT: 12.3 % (ref 11.5–14.5)
GLUCOSE SERPL-MCNC: 90 MG/DL (ref 74–99)
HCT VFR BLD AUTO: 45.6 % (ref 36–46)
HGB BLD-MCNC: 14.9 G/DL (ref 12–16)
IMM GRANULOCYTES # BLD AUTO: 0.02 X10*3/UL (ref 0–0.7)
IMM GRANULOCYTES NFR BLD AUTO: 0.1 % (ref 0–0.9)
LDH SERPL L TO P-CCNC: 149 U/L (ref 84–246)
LYMPHOCYTES # BLD MANUAL: 8.32 X10*3/UL (ref 1.2–4.8)
LYMPHOCYTES NFR BLD MANUAL: 59 %
MCH RBC QN AUTO: 32.1 PG (ref 26–34)
MCHC RBC AUTO-ENTMCNC: 32.7 G/DL (ref 32–36)
MCV RBC AUTO: 98 FL (ref 80–100)
METAMYELOCYTES # BLD MANUAL: 0.28 X10*3/UL
METAMYELOCYTES NFR BLD MANUAL: 2 %
MONOCYTES # BLD MANUAL: 0.71 X10*3/UL (ref 0.1–1)
MONOCYTES NFR BLD MANUAL: 5 %
NEUTROPHILS # BLD MANUAL: 3.38 X10*3/UL (ref 1.2–7.7)
NEUTS BAND # BLD MANUAL: 0.14 X10*3/UL (ref 0–0.7)
NEUTS BAND NFR BLD MANUAL: 1 %
NEUTS SEG # BLD MANUAL: 3.24 X10*3/UL (ref 1.2–7)
NEUTS SEG NFR BLD MANUAL: 23 %
NRBC BLD-RTO: 0.1 /100 WBCS (ref 0–0)
PATH REVIEW-CBC DIFFERENTIAL: NORMAL
PLATELET # BLD AUTO: 276 X10*3/UL (ref 150–450)
POTASSIUM SERPL-SCNC: 4.1 MMOL/L (ref 3.5–5.3)
PROMYELOCYTES # BLD MANUAL: 0.28 X10*3/UL
PROMYELOCYTES NFR BLD MANUAL: 2 %
PROT SERPL-MCNC: 6.4 G/DL (ref 6.4–8.2)
RBC # BLD AUTO: 4.64 X10*6/UL (ref 4–5.2)
RBC MORPH BLD: ABNORMAL
SODIUM SERPL-SCNC: 141 MMOL/L (ref 136–145)
TOTAL CELLS COUNTED BLD: 100
VARIANT LYMPHS # BLD MANUAL: 0.56 X10*3/UL (ref 0–0.5)
VARIANT LYMPHS NFR BLD: 4 %
WBC # BLD AUTO: 14.1 X10*3/UL (ref 4.4–11.3)

## 2024-01-22 PROCEDURE — 36415 COLL VENOUS BLD VENIPUNCTURE: CPT

## 2024-01-22 PROCEDURE — 83615 LACTATE (LD) (LDH) ENZYME: CPT

## 2024-01-22 PROCEDURE — 85007 BL SMEAR W/DIFF WBC COUNT: CPT

## 2024-01-22 PROCEDURE — 85060 BLOOD SMEAR INTERPRETATION: CPT | Performed by: INTERNAL MEDICINE

## 2024-01-22 PROCEDURE — 82232 ASSAY OF BETA-2 PROTEIN: CPT

## 2024-01-22 PROCEDURE — 85027 COMPLETE CBC AUTOMATED: CPT

## 2024-01-22 PROCEDURE — 80053 COMPREHEN METABOLIC PANEL: CPT

## 2024-02-13 ENCOUNTER — APPOINTMENT (OUTPATIENT)
Dept: HEMATOLOGY/ONCOLOGY | Facility: CLINIC | Age: 64
End: 2024-02-13
Payer: COMMERCIAL

## 2024-02-16 ENCOUNTER — LAB (OUTPATIENT)
Dept: LAB | Facility: LAB | Age: 64
End: 2024-02-16
Payer: COMMERCIAL

## 2024-02-16 DIAGNOSIS — C91.10 CLL (CHRONIC LYMPHOCYTIC LEUKEMIA) (MULTI): ICD-10-CM

## 2024-02-16 LAB
ALBUMIN SERPL BCP-MCNC: 4.1 G/DL (ref 3.4–5)
ALP SERPL-CCNC: 125 U/L (ref 33–136)
ALT SERPL W P-5'-P-CCNC: 21 U/L (ref 7–45)
ANION GAP SERPL CALC-SCNC: 15 MMOL/L (ref 10–20)
AST SERPL W P-5'-P-CCNC: 19 U/L (ref 9–39)
B2 MICROGLOB SERPL-MCNC: 2.2 MG/L (ref 0.7–2.2)
BASOPHILS # BLD AUTO: 0.13 X10*3/UL (ref 0–0.1)
BASOPHILS NFR BLD AUTO: 0.8 %
BILIRUB SERPL-MCNC: 0.8 MG/DL (ref 0–1.2)
BUN SERPL-MCNC: 14 MG/DL (ref 6–23)
CALCIUM SERPL-MCNC: 9.7 MG/DL (ref 8.6–10.6)
CHLORIDE SERPL-SCNC: 104 MMOL/L (ref 98–107)
CO2 SERPL-SCNC: 28 MMOL/L (ref 21–32)
CREAT SERPL-MCNC: 0.78 MG/DL (ref 0.5–1.05)
EGFRCR SERPLBLD CKD-EPI 2021: 87 ML/MIN/1.73M*2
EOSINOPHIL # BLD AUTO: 0.21 X10*3/UL (ref 0–0.7)
EOSINOPHIL NFR BLD AUTO: 1.4 %
ERYTHROCYTE [DISTWIDTH] IN BLOOD BY AUTOMATED COUNT: 12.4 % (ref 11.5–14.5)
GLUCOSE SERPL-MCNC: 92 MG/DL (ref 74–99)
HCT VFR BLD AUTO: 44.8 % (ref 36–46)
HGB BLD-MCNC: 15.1 G/DL (ref 12–16)
IMM GRANULOCYTES # BLD AUTO: 0.02 X10*3/UL (ref 0–0.7)
IMM GRANULOCYTES NFR BLD AUTO: 0.1 % (ref 0–0.9)
LDH SERPL L TO P-CCNC: 144 U/L (ref 84–246)
LYMPHOCYTES # BLD AUTO: 11.23 X10*3/UL (ref 1.2–4.8)
LYMPHOCYTES NFR BLD AUTO: 73.3 %
MCH RBC QN AUTO: 33.2 PG (ref 26–34)
MCHC RBC AUTO-ENTMCNC: 33.7 G/DL (ref 32–36)
MCV RBC AUTO: 99 FL (ref 80–100)
MONOCYTES # BLD AUTO: 0.53 X10*3/UL (ref 0.1–1)
MONOCYTES NFR BLD AUTO: 3.5 %
NEUTROPHILS # BLD AUTO: 3.21 X10*3/UL (ref 1.2–7.7)
NEUTROPHILS NFR BLD AUTO: 20.9 %
NRBC BLD-RTO: 0 /100 WBCS (ref 0–0)
PLATELET # BLD AUTO: 267 X10*3/UL (ref 150–450)
POTASSIUM SERPL-SCNC: 4.6 MMOL/L (ref 3.5–5.3)
PROT SERPL-MCNC: 6.3 G/DL (ref 6.4–8.2)
RBC # BLD AUTO: 4.55 X10*6/UL (ref 4–5.2)
SODIUM SERPL-SCNC: 142 MMOL/L (ref 136–145)
WBC # BLD AUTO: 15.3 X10*3/UL (ref 4.4–11.3)

## 2024-02-16 PROCEDURE — 36415 COLL VENOUS BLD VENIPUNCTURE: CPT

## 2024-02-16 PROCEDURE — 85025 COMPLETE CBC W/AUTO DIFF WBC: CPT

## 2024-02-16 PROCEDURE — 83615 LACTATE (LD) (LDH) ENZYME: CPT

## 2024-02-16 PROCEDURE — 82232 ASSAY OF BETA-2 PROTEIN: CPT

## 2024-02-16 PROCEDURE — 80053 COMPREHEN METABOLIC PANEL: CPT

## 2024-02-29 DIAGNOSIS — I10 PRIMARY HYPERTENSION: ICD-10-CM

## 2024-02-29 RX ORDER — OLMESARTAN MEDOXOMIL 20 MG/1
TABLET ORAL
Qty: 90 TABLET | Refills: 1 | Status: SHIPPED | OUTPATIENT
Start: 2024-02-29

## 2024-03-06 ENCOUNTER — APPOINTMENT (OUTPATIENT)
Dept: PRIMARY CARE | Facility: CLINIC | Age: 64
End: 2024-03-06
Payer: COMMERCIAL

## 2024-03-18 ENCOUNTER — LAB (OUTPATIENT)
Dept: LAB | Facility: LAB | Age: 64
End: 2024-03-18
Payer: COMMERCIAL

## 2024-03-18 DIAGNOSIS — Z00.00 ENCOUNTER FOR PREVENTATIVE ADULT HEALTH CARE EXAMINATION: ICD-10-CM

## 2024-03-18 DIAGNOSIS — E55.9 VITAMIN D INSUFFICIENCY: Primary | ICD-10-CM

## 2024-03-18 DIAGNOSIS — E55.9 VITAMIN D INSUFFICIENCY: ICD-10-CM

## 2024-03-18 DIAGNOSIS — C91.10 CLL (CHRONIC LYMPHOCYTIC LEUKEMIA) (MULTI): ICD-10-CM

## 2024-03-18 DIAGNOSIS — E78.49 OTHER HYPERLIPIDEMIA: ICD-10-CM

## 2024-03-18 LAB
25(OH)D3 SERPL-MCNC: 29 NG/ML (ref 30–100)
ALBUMIN SERPL BCP-MCNC: 4.2 G/DL (ref 3.4–5)
ALP SERPL-CCNC: 133 U/L (ref 33–136)
ALT SERPL W P-5'-P-CCNC: 23 U/L (ref 7–45)
ANION GAP SERPL CALC-SCNC: 13 MMOL/L (ref 10–20)
AST SERPL W P-5'-P-CCNC: 20 U/L (ref 9–39)
BASOPHILS # BLD AUTO: 0.08 X10*3/UL (ref 0–0.1)
BASOPHILS NFR BLD AUTO: 0.5 %
BILIRUB SERPL-MCNC: 1 MG/DL (ref 0–1.2)
BUN SERPL-MCNC: 12 MG/DL (ref 6–23)
CALCIUM SERPL-MCNC: 9 MG/DL (ref 8.6–10.3)
CHLORIDE SERPL-SCNC: 103 MMOL/L (ref 98–107)
CHOLEST SERPL-MCNC: 217 MG/DL (ref 0–199)
CHOLESTEROL/HDL RATIO: 4.2
CO2 SERPL-SCNC: 28 MMOL/L (ref 21–32)
CREAT SERPL-MCNC: 0.69 MG/DL (ref 0.5–1.05)
EGFRCR SERPLBLD CKD-EPI 2021: >90 ML/MIN/1.73M*2
EOSINOPHIL # BLD AUTO: 0.11 X10*3/UL (ref 0–0.7)
EOSINOPHIL NFR BLD AUTO: 0.7 %
ERYTHROCYTE [DISTWIDTH] IN BLOOD BY AUTOMATED COUNT: 12.1 % (ref 11.5–14.5)
GLUCOSE SERPL-MCNC: 89 MG/DL (ref 74–99)
HCT VFR BLD AUTO: 45.4 % (ref 36–46)
HDLC SERPL-MCNC: 51.1 MG/DL
HGB BLD-MCNC: 14.9 G/DL (ref 12–16)
IMM GRANULOCYTES # BLD AUTO: 0.03 X10*3/UL (ref 0–0.7)
IMM GRANULOCYTES NFR BLD AUTO: 0.2 % (ref 0–0.9)
LDH SERPL L TO P-CCNC: 141 U/L (ref 84–246)
LDLC SERPL CALC-MCNC: 146 MG/DL
LYMPHOCYTES # BLD AUTO: 11.07 X10*3/UL (ref 1.2–4.8)
LYMPHOCYTES NFR BLD AUTO: 73.5 %
MCH RBC QN AUTO: 32 PG (ref 26–34)
MCHC RBC AUTO-ENTMCNC: 32.8 G/DL (ref 32–36)
MCV RBC AUTO: 98 FL (ref 80–100)
MONOCYTES # BLD AUTO: 0.7 X10*3/UL (ref 0.1–1)
MONOCYTES NFR BLD AUTO: 4.6 %
NEUTROPHILS # BLD AUTO: 3.08 X10*3/UL (ref 1.2–7.7)
NEUTROPHILS NFR BLD AUTO: 20.5 %
NON HDL CHOLESTEROL: 166 MG/DL (ref 0–149)
NRBC BLD-RTO: 0 /100 WBCS (ref 0–0)
PLATELET # BLD AUTO: 271 X10*3/UL (ref 150–450)
POTASSIUM SERPL-SCNC: 4.2 MMOL/L (ref 3.5–5.3)
PROT SERPL-MCNC: 6.2 G/DL (ref 6.4–8.2)
RBC # BLD AUTO: 4.65 X10*6/UL (ref 4–5.2)
SODIUM SERPL-SCNC: 140 MMOL/L (ref 136–145)
TRIGL SERPL-MCNC: 102 MG/DL (ref 0–149)
VLDL: 20 MG/DL (ref 0–40)
WBC # BLD AUTO: 15.1 X10*3/UL (ref 4.4–11.3)

## 2024-03-18 PROCEDURE — 80053 COMPREHEN METABOLIC PANEL: CPT

## 2024-03-18 PROCEDURE — 36415 COLL VENOUS BLD VENIPUNCTURE: CPT

## 2024-03-18 PROCEDURE — 82306 VITAMIN D 25 HYDROXY: CPT

## 2024-03-18 PROCEDURE — 82232 ASSAY OF BETA-2 PROTEIN: CPT

## 2024-03-18 PROCEDURE — 80061 LIPID PANEL: CPT

## 2024-03-18 PROCEDURE — 83615 LACTATE (LD) (LDH) ENZYME: CPT

## 2024-03-18 PROCEDURE — 85025 COMPLETE CBC W/AUTO DIFF WBC: CPT

## 2024-03-19 LAB — B2 MICROGLOB SERPL-MCNC: 2.4 MG/L (ref 0.7–2.2)

## 2024-04-02 ENCOUNTER — APPOINTMENT (OUTPATIENT)
Dept: HEMATOLOGY/ONCOLOGY | Facility: CLINIC | Age: 64
End: 2024-04-02
Payer: COMMERCIAL

## 2024-04-02 ENCOUNTER — APPOINTMENT (OUTPATIENT)
Dept: PRIMARY CARE | Facility: CLINIC | Age: 64
End: 2024-04-02
Payer: COMMERCIAL

## 2024-04-15 ENCOUNTER — LAB (OUTPATIENT)
Dept: LAB | Facility: LAB | Age: 64
End: 2024-04-15
Payer: COMMERCIAL

## 2024-04-15 DIAGNOSIS — C91.10 CLL (CHRONIC LYMPHOCYTIC LEUKEMIA) (MULTI): ICD-10-CM

## 2024-04-15 LAB
ALBUMIN SERPL BCP-MCNC: 4.4 G/DL (ref 3.4–5)
ALP SERPL-CCNC: 128 U/L (ref 33–136)
ALT SERPL W P-5'-P-CCNC: 18 U/L (ref 7–45)
ANION GAP SERPL CALC-SCNC: 13 MMOL/L (ref 10–20)
AST SERPL W P-5'-P-CCNC: 17 U/L (ref 9–39)
B2 MICROGLOB SERPL-MCNC: 2.3 MG/L (ref 0.7–2.2)
BASOPHILS # BLD MANUAL: 0 X10*3/UL (ref 0–0.1)
BASOPHILS NFR BLD MANUAL: 0 %
BILIRUB SERPL-MCNC: 0.8 MG/DL (ref 0–1.2)
BUN SERPL-MCNC: 11 MG/DL (ref 6–23)
CALCIUM SERPL-MCNC: 9.4 MG/DL (ref 8.6–10.3)
CHLORIDE SERPL-SCNC: 105 MMOL/L (ref 98–107)
CO2 SERPL-SCNC: 29 MMOL/L (ref 21–32)
CREAT SERPL-MCNC: 0.68 MG/DL (ref 0.5–1.05)
EGFRCR SERPLBLD CKD-EPI 2021: >90 ML/MIN/1.73M*2
EOSINOPHIL # BLD MANUAL: 0 X10*3/UL (ref 0–0.7)
EOSINOPHIL NFR BLD MANUAL: 0 %
ERYTHROCYTE [DISTWIDTH] IN BLOOD BY AUTOMATED COUNT: 12.1 % (ref 11.5–14.5)
GLUCOSE SERPL-MCNC: 82 MG/DL (ref 74–99)
HCT VFR BLD AUTO: 46.8 % (ref 36–46)
HGB BLD-MCNC: 15.3 G/DL (ref 12–16)
IMM GRANULOCYTES # BLD AUTO: 0.03 X10*3/UL (ref 0–0.7)
IMM GRANULOCYTES NFR BLD AUTO: 0.2 % (ref 0–0.9)
LDH SERPL L TO P-CCNC: 144 U/L (ref 84–246)
LYMPHOCYTES # BLD MANUAL: 10.42 X10*3/UL (ref 1.2–4.8)
LYMPHOCYTES NFR BLD MANUAL: 69 %
MCH RBC QN AUTO: 32.1 PG (ref 26–34)
MCHC RBC AUTO-ENTMCNC: 32.7 G/DL (ref 32–36)
MCV RBC AUTO: 98 FL (ref 80–100)
MONOCYTES # BLD MANUAL: 0.15 X10*3/UL (ref 0.1–1)
MONOCYTES NFR BLD MANUAL: 1 %
NEUTS SEG # BLD MANUAL: 4.53 X10*3/UL (ref 1.2–7)
NEUTS SEG NFR BLD MANUAL: 30 %
NRBC BLD-RTO: 0.1 /100 WBCS (ref 0–0)
PLATELET # BLD AUTO: 266 X10*3/UL (ref 150–450)
POTASSIUM SERPL-SCNC: 4.5 MMOL/L (ref 3.5–5.3)
PROT SERPL-MCNC: 6.5 G/DL (ref 6.4–8.2)
RBC # BLD AUTO: 4.76 X10*6/UL (ref 4–5.2)
RBC MORPH BLD: ABNORMAL
SODIUM SERPL-SCNC: 142 MMOL/L (ref 136–145)
TOTAL CELLS COUNTED BLD: 100
WBC # BLD AUTO: 15.1 X10*3/UL (ref 4.4–11.3)

## 2024-04-15 PROCEDURE — 36415 COLL VENOUS BLD VENIPUNCTURE: CPT

## 2024-04-15 PROCEDURE — 83615 LACTATE (LD) (LDH) ENZYME: CPT

## 2024-04-15 PROCEDURE — 82232 ASSAY OF BETA-2 PROTEIN: CPT

## 2024-04-15 PROCEDURE — 85007 BL SMEAR W/DIFF WBC COUNT: CPT

## 2024-04-15 PROCEDURE — 80053 COMPREHEN METABOLIC PANEL: CPT

## 2024-04-15 PROCEDURE — 85027 COMPLETE CBC AUTOMATED: CPT

## 2024-04-15 ASSESSMENT — ENCOUNTER SYMPTOMS
DIARRHEA: 0
CHILLS: 0
PSYCHIATRIC NEGATIVE: 1
DIAPHORESIS: 0
FEVER: 0
NEUROLOGICAL NEGATIVE: 1
RESPIRATORY NEGATIVE: 1
UNEXPECTED WEIGHT CHANGE: 0
NAUSEA: 0
FATIGUE: 0
CONSTITUTIONAL NEGATIVE: 1
CONSTIPATION: 0
EYES NEGATIVE: 1
CARDIOVASCULAR NEGATIVE: 1
BLOOD IN STOOL: 0
MUSCULOSKELETAL NEGATIVE: 1
HEMATOLOGIC/LYMPHATIC NEGATIVE: 1
APPETITE CHANGE: 0
RECTAL PAIN: 0
VOMITING: 0

## 2024-04-16 ENCOUNTER — OFFICE VISIT (OUTPATIENT)
Dept: HEMATOLOGY/ONCOLOGY | Facility: CLINIC | Age: 64
End: 2024-04-16
Payer: COMMERCIAL

## 2024-04-16 VITALS
HEART RATE: 70 BPM | SYSTOLIC BLOOD PRESSURE: 128 MMHG | WEIGHT: 184.3 LBS | TEMPERATURE: 97.5 F | RESPIRATION RATE: 18 BRPM | BODY MASS INDEX: 30.2 KG/M2 | DIASTOLIC BLOOD PRESSURE: 84 MMHG | OXYGEN SATURATION: 96 %

## 2024-04-16 DIAGNOSIS — C91.10 CLL (CHRONIC LYMPHOCYTIC LEUKEMIA) (MULTI): Primary | ICD-10-CM

## 2024-04-16 DIAGNOSIS — D72.820 LYMPHOCYTOSIS: ICD-10-CM

## 2024-04-16 PROCEDURE — 3074F SYST BP LT 130 MM HG: CPT

## 2024-04-16 PROCEDURE — 3079F DIAST BP 80-89 MM HG: CPT

## 2024-04-16 PROCEDURE — 99214 OFFICE O/P EST MOD 30 MIN: CPT

## 2024-04-16 RX ORDER — VIT C/E/ZN/COPPR/LUTEIN/ZEAXAN 250MG-90MG
4000 CAPSULE ORAL DAILY
COMMUNITY

## 2024-04-16 ASSESSMENT — ENCOUNTER SYMPTOMS
OCCASIONAL FEELINGS OF UNSTEADINESS: 0
WHEEZING: 0
SHORTNESS OF BREATH: 0
DEPRESSION: 0
LOSS OF SENSATION IN FEET: 0
COUGH: 0
ABDOMINAL PAIN: 0

## 2024-04-16 ASSESSMENT — PAIN SCALES - GENERAL: PAINLEVEL: 0-NO PAIN

## 2024-04-16 NOTE — PROGRESS NOTES
Patient ID: Priya Izaguirre is a 63 y.o. female.    Treatment:   Oncology History Overview Note   8/30/23 Noticed to have high WBC with ALC 9.2  10/30/23 ALC 10, HGB 14.5,     Flow cytometry:  CD5 positive B-cell population with an immunophenotype that is most suggestive of chronic lymphocytic leukemia/small lymphocytic lymphoma; the frequency of the cells is slightly less than 5x109 and likely representing an evolving CLL/SLL.     FISH analysis showed NORMAL results with no evidence of RONNELL (11q), translocation 11;14- IGH/CCND1, trisomy 12, 13q14 deletion, monosomy 13 and TP53 (17p) deletion.    IgHV:  Clone 1: Sequence identity: 96.43% (IGHV and IGHJ reference alleles: [id number], [id number])  Clone 2: Sequence identity: 100.0% (IGHV and IGHJ reference alleles: [id number], [id number])  Current guidelines favor managing these patients as unmutated-CLL (Leukemia 2022; 36:1145-5688)     NGS  NOTCH1 p.[id number]*4, VAF: 19%  Negative for TP53 mutation.  Negative for RONNELL mutation.     CLL (chronic lymphocytic leukemia) (Multi)   11/16/2023 Initial Diagnosis    CLL (chronic lymphocytic leukemia) (CMS/HCC)       Response:     Past Medical History:  HTN    Surgical History:  Meniscus repair 2011, myomectomy 2001     Family History:   Family History   Problem Relation Name Age of Onset    Hearing loss Mother Andie     Stroke Mother Andie 70    Diabetes Father Parish     Heart disease Father Parish 65        ischemic    Hypertension Father Parish     Hearing loss Father Parish     Thyroid cancer Sister      No Known Problems Sister      No Known Problems Sister      No Known Problems Brother      Vision loss Maternal Grandfather Ed        Social History:  Single, no children, never smoker, occasional alcohol, retired  for [employer] from [employer]     Social History     Tobacco Use    Smoking status: Never     Passive exposure: Never    Smokeless tobacco: Never   Substance Use Topics    Alcohol use: Never    Drug use: Never       -------------------------------------------------------------------------------------------------------  Subjective       HPI    Ms. Priya Izaguirre is a 63 years old with h/o HTN, who on routine follow up she was found to have leukocytosis with lymphocytic predominence in Aug 2023, after proof to be persistent further evaluation was done as descrbed above.  The patient remains asymptomatic. She denies any fevers, night sweats or weight loss. She's been having hot flashes sometimes, but doesn't get fever. She states having chronic abdominal pain, for which she was evaluated in the past with EGD that showed hiatal hernia and she was also told she had fibroids. She never felt any lymph node enlargement, no skin rash.    CT scan from 12/18/23 showing no acute abnormalities within abdomen or pelvis, areas of cortical thinning and scarring involving the left kidney.    Priya presents to the clinic today (4/16/24) for follow up evaluation of her CLL and blood work.  Reports she is doing well overall and has had no changes to her health.  Feeling fine.  Energy level is lower at times in the last few months.  Finds she sleeps more or naps in afternoon.  Walks to stay active and does yoga about 3 days per week.  Appetite is good, has gained some weight.  Drinking protein powder shake now.  Follows a vegetarian diet.     Review of Systems   Constitutional: Negative.  Negative for appetite change, chills, diaphoresis, fatigue, fever and unexpected weight change.   HENT:  Negative.     Eyes: Negative.    Respiratory: Negative.  Negative for cough, shortness of breath and wheezing.    Cardiovascular: Negative.    Gastrointestinal:  Negative for abdominal pain, blood in stool, constipation, diarrhea, nausea, rectal pain and vomiting.   Genitourinary: Negative.     Musculoskeletal: Negative.    Skin: Negative.    Neurological: Negative.    Hematological: Negative.    Psychiatric/Behavioral: Negative.         -------------------------------------------------------------------------------------------------------  Objective   BSA: 1.97 meters squared  /84   Pulse 70   Temp 36.4 °C (97.5 °F)   Resp 18   Wt 83.6 kg (184 lb 4.9 oz)   SpO2 96%   BMI 30.20 kg/m²     Physical Exam  Constitutional:       Appearance: Normal appearance. She is normal weight.   HENT:      Head: Normocephalic.      Nose: Nose normal.      Mouth/Throat:      Mouth: Mucous membranes are moist.   Eyes:      Conjunctiva/sclera: Conjunctivae normal.      Pupils: Pupils are equal, round, and reactive to light.   Neck:      Vascular: No carotid bruit.   Cardiovascular:      Rate and Rhythm: Normal rate and regular rhythm.      Pulses: Normal pulses.      Heart sounds: Normal heart sounds.   Pulmonary:      Effort: Pulmonary effort is normal.      Breath sounds: Normal breath sounds.   Abdominal:      General: Abdomen is flat. Bowel sounds are normal.      Palpations: Abdomen is soft. There is no mass.      Tenderness: There is no abdominal tenderness. There is no guarding or rebound.   Musculoskeletal:         General: No swelling or tenderness. Normal range of motion.      Cervical back: Normal range of motion and neck supple.   Lymphadenopathy:      Cervical: No cervical adenopathy.      Comments: No lymphadenopathy   Skin:     General: Skin is warm.   Neurological:      General: No focal deficit present.      Mental Status: She is alert and oriented to person, place, and time.   Psychiatric:         Mood and Affect: Mood normal.         Behavior: Behavior normal.       Performance Status:  ECOG Performance Status: 0 fully active  -------------------------------------------------------------------------------------------------------  Assessment/Plan      Chronic Lymphocytic Leukemia:  63 years old, female with history of hypertension, was incidentally found to have leukocytosis with lymphocytic predominance in August 2023. further evaluation  confirmed the diagnosis of CLL. FISH normal and IgHV unmutated. NGS with only NOTCH1 mutation. Patient has been asymptomatic with no palpable lymphadenopathy, however, since she's been having recurrent abdominal pain, evaluation for intraabdominal lymphadenopathy is warranted.  So far considered at SAHA 0, IPS-E 1, intermediate risk with 5 years cumulative risk for starting treatment of 28.4%.    Plan  - CT A/P to check for possible lymphadenopathy  - observation  Patient seen and examined with Dr. Escobedo for newly diagnosed apparently stage 0 CLL, only adverse factor is unmutated IgH gene rearrangement. Her PCP, Dr. Aquino did a very through evaluation prior to our meeting today No lymphadenopathy on exam and will complete evaluation with abdominal CT scan due to abdomenal pain which I suspect is GERD. I reviewed the generally indolent nature of CLL with the patient as well as indications to start treatment.  As above using IPS-E scal 25% chance of requiring treatment in 5 years, which are generally biologic medications.      4/16/24:  CT AP (12/18/23) showing no acute abnormalities within abdomen or pelvis, areas of cortical thinning and scarring involving the left kidney.  Laboratory results from 4/15/24: hgb 15.3, plts 266, and WBC elevated at 15.1 but remains stable.  No concerning findings on physical examination.  Follow up visit in 6 months.    WBC   Date Value Ref Range Status   04/15/2024 15.1 (H) 4.4 - 11.3 x10*3/uL Final   03/18/2024 15.1 (H) 4.4 - 11.3 x10*3/uL Final   02/16/2024 15.3 (H) 4.4 - 11.3 x10*3/uL Final     Healthcare Maintenance:  Received influenza and RSV vaccines on 11/28/23.  Advised can obtain updated covid vaccine at local pharmacy.  Mammogram (9/14/23): No mammographic evidence of malignancy.     RTC:  Follow up visit in 6 months with provider.  Advised to obtain blood work prior to  visit.  -------------------------------------------------------------------------------------------------------  TRAMAINE Hays

## 2024-05-30 ENCOUNTER — APPOINTMENT (OUTPATIENT)
Dept: PRIMARY CARE | Facility: CLINIC | Age: 64
End: 2024-05-30
Payer: COMMERCIAL

## 2024-06-20 ENCOUNTER — APPOINTMENT (OUTPATIENT)
Dept: OTOLARYNGOLOGY | Facility: HOSPITAL | Age: 64
End: 2024-06-20
Payer: COMMERCIAL

## 2024-08-07 ENCOUNTER — TELEPHONE (OUTPATIENT)
Dept: PRIMARY CARE | Facility: CLINIC | Age: 64
End: 2024-08-07

## 2024-08-24 DIAGNOSIS — I10 PRIMARY HYPERTENSION: ICD-10-CM

## 2024-08-26 RX ORDER — OLMESARTAN MEDOXOMIL 20 MG/1
TABLET ORAL
Qty: 90 TABLET | Refills: 0 | Status: SHIPPED | OUTPATIENT
Start: 2024-08-26

## 2024-09-02 ASSESSMENT — ENCOUNTER SYMPTOMS
CHEST TIGHTNESS: 0
FATIGUE: 0
UNEXPECTED WEIGHT CHANGE: 0
COLOR CHANGE: 0
ABDOMINAL PAIN: 0
ACTIVITY CHANGE: 0
ABDOMINAL DISTENTION: 0
DIZZINESS: 0
DIFFICULTY URINATING: 0
HEADACHES: 0
JOINT SWELLING: 0
SHORTNESS OF BREATH: 0
ADENOPATHY: 0
DYSURIA: 0
WEAKNESS: 0

## 2024-09-02 NOTE — PROGRESS NOTES
Annual-menopause  Subjective   Priya Izaguirre is a 64 y.o. female who is here for a routine exam.   Complaints:   denies vag bleed or discharge; denies pelvic  pain, pressure, or persistent bloating.   PMHx: HTN. CLL dxd ; confirmed [facility] and        Leiomyoma.  L breast cyst; .   prev gyn care Dr. LEROY Payne until ; see records.  Surg Hx: myomectomy; 6 fibroids removed/largest was 12cm;NC         tonsillectomy 2nd grade        colon polyps - last scope 2022 [place]; f/u due          D and C for abnormal endometrium; [place]   Father: ; Mother: alive, Htn  Sister  Thyroid  ca    Last pap  22 both pap/hpv neg.   Mamm 2023-NEG   Abn Mamm  L brst; dx studies c/w simple cyst.   DEXA  9/15/2021 normal; completes at [facility] on [address].   Pelvic US: 9/15/2021 Multifibroid uterus, left adnexal structure.   KRISHAN - age 54. Menarche 14.   STD  Never. Not currently sexually active; updated 2024   Total preg  0.    Review of Systems   Constitutional:  Negative for activity change, fatigue and unexpected weight change.   Respiratory:  Negative for chest tightness and shortness of breath.    Cardiovascular:  Negative for chest pain and leg swelling.   Gastrointestinal:  Negative for abdominal distention and abdominal pain.   Genitourinary:  Negative for difficulty urinating, dysuria, genital sores, pelvic pain, vaginal bleeding, vaginal discharge and vaginal pain.   Musculoskeletal:  Negative for gait problem and joint swelling.   Skin:  Negative for color change and rash.   Neurological:  Negative for dizziness, weakness and headaches.   Hematological:  Negative for adenopathy.   Objective Visit Vitals  /84   Wt 84.4 kg (186 lb)   BMI 30.48 kg/m²   OB Status Postmenopausal   Smoking Status Never   BSA 1.98 m²       General:   Alert and oriented, in no acute distress   Neck: Supple. No visible thyromegaly.    Breast/Axilla: Normal to palpation bilaterally without  masses, skin changes, or nipple discharge.    Abdomen: Soft, non-tender, without masses or organomegaly   Vulva: Normal architecture without erythema, masses, or lesions.    Vagina:  mucosa without lesions, masses.  Positive atrophy. No abnormal vaginal discharge.    Cervix: Normal without masses, lesions, or signs of cervicitis.    Uterus: Grossly normal mobile, non-enlarged uterus; exam limited by bmi    Adnexa: No palpable masses or tenderness; exam limited by bmi   Pelvic Floor No POP noted. No high tone pelvic floor    Psych  Rectal Normal affect. Normal mood.      Assessment/Plan   Encounter Diagnoses   Name Primary?    Visit for gynecologic examination; grossly nl breast/gyn exams. Yes    Encounter for screening mammogram for malignant neoplasm of breast; order placed     Menopause; minimal sxs.     Postmenopausal atrophic vaginitis; mild tissue changes.     Postmenopausal bone loss; prevention strategies rvwd.     Screen for colon cancer; reports utd and neg.     Rosanne Niño MD

## 2024-09-03 ENCOUNTER — APPOINTMENT (OUTPATIENT)
Dept: PRIMARY CARE | Facility: CLINIC | Age: 64
End: 2024-09-03
Payer: COMMERCIAL

## 2024-09-04 ENCOUNTER — APPOINTMENT (OUTPATIENT)
Dept: PRIMARY CARE | Facility: CLINIC | Age: 64
End: 2024-09-04
Payer: COMMERCIAL

## 2024-09-04 ENCOUNTER — OFFICE VISIT (OUTPATIENT)
Dept: OBSTETRICS AND GYNECOLOGY | Facility: CLINIC | Age: 64
End: 2024-09-04
Payer: COMMERCIAL

## 2024-09-04 ENCOUNTER — LAB (OUTPATIENT)
Dept: LAB | Facility: LAB | Age: 64
End: 2024-09-04
Payer: COMMERCIAL

## 2024-09-04 ENCOUNTER — HOSPITAL ENCOUNTER (OUTPATIENT)
Dept: RADIOLOGY | Facility: HOSPITAL | Age: 64
Discharge: HOME | End: 2024-09-04
Payer: COMMERCIAL

## 2024-09-04 VITALS
WEIGHT: 186 LBS | BODY MASS INDEX: 29.89 KG/M2 | HEART RATE: 76 BPM | SYSTOLIC BLOOD PRESSURE: 164 MMHG | HEIGHT: 66 IN | DIASTOLIC BLOOD PRESSURE: 91 MMHG | TEMPERATURE: 98.6 F

## 2024-09-04 VITALS — SYSTOLIC BLOOD PRESSURE: 154 MMHG | DIASTOLIC BLOOD PRESSURE: 84 MMHG | BODY MASS INDEX: 30.48 KG/M2 | WEIGHT: 186 LBS

## 2024-09-04 DIAGNOSIS — B35.1 ONYCHOMYCOSIS: ICD-10-CM

## 2024-09-04 DIAGNOSIS — I10 PRIMARY HYPERTENSION: ICD-10-CM

## 2024-09-04 DIAGNOSIS — Z00.00 ENCOUNTER FOR PREVENTATIVE ADULT HEALTH CARE EXAMINATION: ICD-10-CM

## 2024-09-04 DIAGNOSIS — K21.9 GASTROESOPHAGEAL REFLUX DISEASE WITHOUT ESOPHAGITIS: ICD-10-CM

## 2024-09-04 DIAGNOSIS — Z78.0 MENOPAUSE: ICD-10-CM

## 2024-09-04 DIAGNOSIS — Z01.419 VISIT FOR GYNECOLOGIC EXAMINATION: Primary | ICD-10-CM

## 2024-09-04 DIAGNOSIS — Z00.00 ENCOUNTER FOR PREVENTATIVE ADULT HEALTH CARE EXAMINATION: Primary | ICD-10-CM

## 2024-09-04 DIAGNOSIS — N95.2 POSTMENOPAUSAL ATROPHIC VAGINITIS: ICD-10-CM

## 2024-09-04 DIAGNOSIS — R91.1 PULMONARY NODULE: ICD-10-CM

## 2024-09-04 DIAGNOSIS — E78.49 OTHER HYPERLIPIDEMIA: ICD-10-CM

## 2024-09-04 DIAGNOSIS — Z12.11 SCREEN FOR COLON CANCER: ICD-10-CM

## 2024-09-04 DIAGNOSIS — R32 URINARY INCONTINENCE, UNSPECIFIED TYPE: ICD-10-CM

## 2024-09-04 DIAGNOSIS — M25.561 RIGHT ANTERIOR KNEE PAIN: ICD-10-CM

## 2024-09-04 DIAGNOSIS — C91.10 CLL (CHRONIC LYMPHOCYTIC LEUKEMIA) (MULTI): ICD-10-CM

## 2024-09-04 DIAGNOSIS — M81.0 POSTMENOPAUSAL BONE LOSS: ICD-10-CM

## 2024-09-04 DIAGNOSIS — Z12.31 ENCOUNTER FOR SCREENING MAMMOGRAM FOR MALIGNANT NEOPLASM OF BREAST: ICD-10-CM

## 2024-09-04 PROBLEM — R05.3 CHRONIC COUGH: Status: RESOLVED | Noted: 2023-09-21 | Resolved: 2024-09-04

## 2024-09-04 PROBLEM — J31.0 CHRONIC RHINITIS: Status: RESOLVED | Noted: 2023-09-21 | Resolved: 2024-09-04

## 2024-09-04 PROBLEM — J04.0 LARYNGITIS: Status: RESOLVED | Noted: 2023-09-21 | Resolved: 2024-09-04

## 2024-09-04 LAB
25(OH)D3 SERPL-MCNC: 33 NG/ML (ref 30–100)
ALBUMIN SERPL BCP-MCNC: 3.8 G/DL (ref 3.4–5)
ALP SERPL-CCNC: 130 U/L (ref 33–136)
ALT SERPL W P-5'-P-CCNC: 26 U/L (ref 7–45)
ANION GAP SERPL CALC-SCNC: 13 MMOL/L (ref 10–20)
APPEARANCE UR: CLEAR
AST SERPL W P-5'-P-CCNC: 22 U/L (ref 9–39)
BASOPHILS # BLD AUTO: 0.1 X10*3/UL (ref 0–0.1)
BASOPHILS NFR BLD AUTO: 0.6 %
BILIRUB SERPL-MCNC: 0.9 MG/DL (ref 0–1.2)
BILIRUB UR STRIP.AUTO-MCNC: NEGATIVE MG/DL
BUN SERPL-MCNC: 12 MG/DL (ref 6–23)
CALCIUM SERPL-MCNC: 8.7 MG/DL (ref 8.6–10.3)
CHLORIDE SERPL-SCNC: 103 MMOL/L (ref 98–107)
CHOLEST SERPL-MCNC: 218 MG/DL (ref 0–199)
CHOLESTEROL/HDL RATIO: 4.2
CO2 SERPL-SCNC: 28 MMOL/L (ref 21–32)
COLOR UR: NORMAL
CREAT SERPL-MCNC: 0.71 MG/DL (ref 0.5–1.05)
CREAT UR-MCNC: 52.4 MG/DL (ref 20–320)
EGFRCR SERPLBLD CKD-EPI 2021: >90 ML/MIN/1.73M*2
EOSINOPHIL # BLD AUTO: 0.12 X10*3/UL (ref 0–0.7)
EOSINOPHIL NFR BLD AUTO: 0.7 %
ERYTHROCYTE [DISTWIDTH] IN BLOOD BY AUTOMATED COUNT: 12.2 % (ref 11.5–14.5)
EST. AVERAGE GLUCOSE BLD GHB EST-MCNC: 111 MG/DL
GLUCOSE SERPL-MCNC: 81 MG/DL (ref 74–99)
GLUCOSE UR STRIP.AUTO-MCNC: NORMAL MG/DL
HBA1C MFR BLD: 5.5 %
HCT VFR BLD AUTO: 44.6 % (ref 36–46)
HDLC SERPL-MCNC: 52.3 MG/DL
HGB BLD-MCNC: 14.8 G/DL (ref 12–16)
IMM GRANULOCYTES # BLD AUTO: 0.03 X10*3/UL (ref 0–0.7)
IMM GRANULOCYTES NFR BLD AUTO: 0.2 % (ref 0–0.9)
KETONES UR STRIP.AUTO-MCNC: NEGATIVE MG/DL
LDLC SERPL CALC-MCNC: 142 MG/DL
LEUKOCYTE ESTERASE UR QL STRIP.AUTO: NEGATIVE
LYMPHOCYTES # BLD AUTO: 12.42 X10*3/UL (ref 1.2–4.8)
LYMPHOCYTES NFR BLD AUTO: 75.7 %
MCH RBC QN AUTO: 32.6 PG (ref 26–34)
MCHC RBC AUTO-ENTMCNC: 33.2 G/DL (ref 32–36)
MCV RBC AUTO: 98 FL (ref 80–100)
MICROALBUMIN UR-MCNC: <7 MG/L
MICROALBUMIN/CREAT UR: NORMAL MG/G{CREAT}
MONOCYTES # BLD AUTO: 0.55 X10*3/UL (ref 0.1–1)
MONOCYTES NFR BLD AUTO: 3.4 %
NEUTROPHILS # BLD AUTO: 3.19 X10*3/UL (ref 1.2–7.7)
NEUTROPHILS NFR BLD AUTO: 19.4 %
NITRITE UR QL STRIP.AUTO: NEGATIVE
NON HDL CHOLESTEROL: 166 MG/DL (ref 0–149)
NRBC BLD-RTO: 0 /100 WBCS (ref 0–0)
PH UR STRIP.AUTO: 7.5 [PH]
PLATELET # BLD AUTO: 303 X10*3/UL (ref 150–450)
POTASSIUM SERPL-SCNC: 4.5 MMOL/L (ref 3.5–5.3)
PROT SERPL-MCNC: 6.1 G/DL (ref 6.4–8.2)
PROT UR STRIP.AUTO-MCNC: NEGATIVE MG/DL
RBC # BLD AUTO: 4.54 X10*6/UL (ref 4–5.2)
RBC # UR STRIP.AUTO: NEGATIVE /UL
SODIUM SERPL-SCNC: 139 MMOL/L (ref 136–145)
SP GR UR STRIP.AUTO: 1.01
TRIGL SERPL-MCNC: 118 MG/DL (ref 0–149)
TSH SERPL-ACNC: 1.82 MIU/L (ref 0.44–3.98)
UROBILINOGEN UR STRIP.AUTO-MCNC: NORMAL MG/DL
VLDL: 24 MG/DL (ref 0–40)
WBC # BLD AUTO: 16.4 X10*3/UL (ref 4.4–11.3)

## 2024-09-04 PROCEDURE — 99396 PREV VISIT EST AGE 40-64: CPT | Performed by: INTERNAL MEDICINE

## 2024-09-04 PROCEDURE — 3077F SYST BP >= 140 MM HG: CPT | Performed by: INTERNAL MEDICINE

## 2024-09-04 PROCEDURE — 84443 ASSAY THYROID STIM HORMONE: CPT

## 2024-09-04 PROCEDURE — 80053 COMPREHEN METABOLIC PANEL: CPT

## 2024-09-04 PROCEDURE — 3079F DIAST BP 80-89 MM HG: CPT | Performed by: OBSTETRICS & GYNECOLOGY

## 2024-09-04 PROCEDURE — 99396 PREV VISIT EST AGE 40-64: CPT | Performed by: OBSTETRICS & GYNECOLOGY

## 2024-09-04 PROCEDURE — 82043 UR ALBUMIN QUANTITATIVE: CPT

## 2024-09-04 PROCEDURE — 3080F DIAST BP >= 90 MM HG: CPT | Performed by: INTERNAL MEDICINE

## 2024-09-04 PROCEDURE — 83036 HEMOGLOBIN GLYCOSYLATED A1C: CPT

## 2024-09-04 PROCEDURE — 73564 X-RAY EXAM KNEE 4 OR MORE: CPT | Mod: RIGHT SIDE | Performed by: RADIOLOGY

## 2024-09-04 PROCEDURE — 80061 LIPID PANEL: CPT

## 2024-09-04 PROCEDURE — 36415 COLL VENOUS BLD VENIPUNCTURE: CPT

## 2024-09-04 PROCEDURE — 3077F SYST BP >= 140 MM HG: CPT | Performed by: OBSTETRICS & GYNECOLOGY

## 2024-09-04 PROCEDURE — 82570 ASSAY OF URINE CREATININE: CPT

## 2024-09-04 PROCEDURE — 81003 URINALYSIS AUTO W/O SCOPE: CPT

## 2024-09-04 PROCEDURE — 73564 X-RAY EXAM KNEE 4 OR MORE: CPT | Mod: RT

## 2024-09-04 PROCEDURE — 99214 OFFICE O/P EST MOD 30 MIN: CPT | Performed by: INTERNAL MEDICINE

## 2024-09-04 PROCEDURE — 85025 COMPLETE CBC W/AUTO DIFF WBC: CPT

## 2024-09-04 PROCEDURE — 82306 VITAMIN D 25 HYDROXY: CPT

## 2024-09-04 PROCEDURE — 1036F TOBACCO NON-USER: CPT | Performed by: INTERNAL MEDICINE

## 2024-09-04 PROCEDURE — 3008F BODY MASS INDEX DOCD: CPT | Performed by: INTERNAL MEDICINE

## 2024-09-04 RX ORDER — AMLODIPINE AND OLMESARTAN MEDOXOMIL 5; 20 MG/1; MG/1
1 TABLET ORAL DAILY
Qty: 90 TABLET | Refills: 0 | Status: SHIPPED | OUTPATIENT
Start: 2024-09-04

## 2024-09-04 ASSESSMENT — PATIENT HEALTH QUESTIONNAIRE - PHQ9
SUM OF ALL RESPONSES TO PHQ9 QUESTIONS 1 & 2: 0
SUM OF ALL RESPONSES TO PHQ9 QUESTIONS 1 & 2: 0
1. LITTLE INTEREST OR PLEASURE IN DOING THINGS: NOT AT ALL
2. FEELING DOWN, DEPRESSED OR HOPELESS: NOT AT ALL
1. LITTLE INTEREST OR PLEASURE IN DOING THINGS: NOT AT ALL
2. FEELING DOWN, DEPRESSED OR HOPELESS: NOT AT ALL

## 2024-09-04 ASSESSMENT — PAIN SCALES - GENERAL: PAINLEVEL: 0-NO PAIN

## 2024-09-04 NOTE — PATIENT INSTRUCTIONS
Priya,   I have ordered blood and/or urine tests for you to do today. The lab can be found on this floor (2nd floor) next to the pharmacy across from the elevators.    Vaccines at pharmacy: Flu and covid boosters  Blood pressure   STOP olmesartan   START amlodipine/olmesartan combination   Continue measuring your home blood pressure readings  Goal is 120s/70s   If cholesterol remains high, we will start ROSUVASTATIN 5mg at bedtime   Stop by the first floor for your x-ray or call [phone] to have this scheduled.  Physical therapy referrals   For knee pain   Pelvic floor   Increase protein and introduce strength training     Followup 3 months

## 2024-09-04 NOTE — PROGRESS NOTES
Subjective     Patient ID: Priya Izaguirre is a 64 y.o. female who presents for No chief complaint on file..  HPI  64-year-old female here for preventive care visit, last seen last year.      9/23: apob high, lipids up, lp(a) low.  vit D insufficient, leukocytosis repeat 1 month.  Lymphocytosis slightly worse does she have symptoms? If so refer to heme and perform smear + flow cytometry.   3/24: trigs improved, vitamin d mildly low   5/24: BP elevated consider amlodipine  7/24: Knee pain either see me or orthopedic surgery    Past medical history  - Indolent low grade CLL - newly diagnosed seen by Dr. Keys in April recommended continue watchful waiting followed every 6 months. Participating in trial for pneumonia vaccine, has has had 2 vaccinations thus far with 20 and 23 vaccinations at [facility]   - HTN - on olmesartan 20, previously on thiazide intolerant. Measuring home blood pressure readings which are largely uncontrolled.   - HLD - CAC 2 in 2019, family history of ASCVD, elevated APO B, LP(a) low  - GERD and hiatal hernia - on famotidine prn EGD 12/23  - Pulmonary nodules - completed 2-year follow-up  - Uterine fibroids - history of myomectomy 2001   - Right meniscus repair -  in 2011, now with worsening right knee pain. Sprained her ankle 11/22 with residual discomfort now worsening her knee pain, notes to have difficulty straightening her leg and notes her knee turned in, affecting her sleep has been progressively worsening all year. Has not yet seen orthopedics, has since stopped the elliptical.   - Left congenital hearing loss - gets tested every 10 years declined hearing aid.  - Oneil's neuroma-seen by podiatry last year     Social;   - Lives at home with dog.   - 3 sisters and a brother  - No tobacco, alcohol or drugs  - Retired - former .      Lifestyle   - Diet - strict vegetarian since 2006 not too much junk foods, stays away from highly processed foods. Salads, no sodas or juices  "  - Exercise - does cardio, walks 10k steps.   - Sleep -  regular uninterrupted 8 hours at least though limited related to her knee   Review of Systems:   General: No constitutional symptoms, mild weight gain   HEENT: No headaches, changes in vision or hearing, no sinus or dental issues   Cardiac: No chest pain, rare palpitations, dyspnea on exertion   Lungs: No cough, wheezing, shortness of breath   Abdomen: No abdominal pain, nausea/vomiting, diarrhea or constipation   : No urinary complaints   Musculoskeletal: no joint pains, swelling or tenderness   Heme: No bleeding or thrombosis issues   Lymph: No swollen lymph glands   Skin: No rashes or lesions   Psych: No reported anxiety or depression     Objective       Current Outpatient Medications:     amlodipine-olmesartan (Clarisse) 5-20 mg tablet, Take 1 tablet by mouth once daily., Disp: 90 tablet, Rfl: 0    cholecalciferol (Vitamin D-3) 25 MCG (1000 UT) capsule, Take 4 capsules (100 mcg) by mouth once daily., Disp: , Rfl:     olmesartan (BENIcar) 20 mg tablet, TAKE 1 TABLET BY MOUTH EVERY DAY, Disp: 90 tablet, Rfl: 0    sodium fluoride-pot nitrate 1.1-5 % paste, USE AS DIRECTED, Disp: , Rfl:       BP (!) 164/91   Pulse 76   Temp 37 °C (98.6 °F)   Ht 1.664 m (5' 5.5\")   Wt 84.4 kg (186 lb)   BMI 30.48 kg/m²       Physical Examination:   General: Awake, alert, appears stated age   Head/eyes/ears: NCAT, EOMI, PERRL, TM WNL, no cerumen  Throat: moist mucus membranes, no pharyngeal erythema  Neck: Supple, nontender, no lymphadenopathy, thyroid exam unremarkable   Breast exam: deferred to gyn   Heart: RRR, no murmurs, rubs or gallops  Lungs: CTA bilaterally, no rhonchi rales or wheezes   Abdomen: Soft, NT/ND  Extremities: No edema, 2+ DP pulses   Skin: No concerning skin lesions on visualized skin, +onychomycosis   Neuro: AAO x 3, no FND, gait unremarkable  Gait, no effusions or erythema appreciated. No asymmetric crepitus.   Right knee: ROM: Reduced extension and " flexion , no joint line tenderness in deep flexion,   Full ROM at hip, no joint line tenderness, Tabitha's test negative, negative varus and valgus stress testing, negative anterior drawer testing.      Assessment/Plan   Assessment & Plan  Encounter for preventative adult health care examination  Adult Health Examination  Age appropriate screening performed   Healthy lifestyle reviewed.   Depression screen negative   No additional pertinent family history or toxic habits   No high risk sexual behavior, declines STI screen   Cancer screening:   - Colonoscopy 12/22 repeat 5 years  - Mammogram 9/23 due for repeat  - Pap smear plus HPV 8/22 visit with gyn today   - Skin cancer prevention strategies reviewed followed   Immunizations: Flu shot and COVID booster recommended when available.    UTD with Tdap, shingrix, pneumococcal, RSV   Dental and visual examinations UTD   Discussed adequate Vitamin D intake   Orders:    CBC and Auto Differential; Future    Comprehensive Metabolic Panel; Future    Lipid Panel; Future    Hemoglobin A1C; Future    TSH with reflex to Free T4 if abnormal; Future    Vitamin D 25-Hydroxy,Total (for eval of Vitamin D levels); Future    XR knee right 3 views; Future    Referral to Physical Therapy; Future    Right anterior knee pain  With suggestion of patellofemoral syndrome, obtain x-ray for further characterization given history of meniscal repair in the past.  Referred to PT       Urinary incontinence, unspecified type  Interested in pelvic PT check urine studies  Orders:    Referral to Physical Therapy; Future    Primary hypertension  Uncontrolled despite olmesartan 20 mg, routinely checks home blood pressure monitoring  Will add amlodipine.  Potential side effects including lower extremity edema reviewed.  Continue home blood pressure monitoring goal 120s over 70s.    Orders:    amlodipine-olmesartan (Clarisse) 5-20 mg tablet; Take 1 tablet by mouth once daily.    Urinalysis with Reflex  Microscopic; Future    Albumin-Creatinine Ratio, Urine Random; Future    Pulmonary nodule  Subsequent work-up unremarkable.         Other hyperlipidemia  CAC 2 in 2019, dyslipidemia and family history of ASCVD  Discussed recommendation for lipid-lowering therapy and she is amenable.  Will start rosuvastatin 5 mg nightly, potential side effects and management expectations reviewed.          Gastroesophageal reflux disease without esophagitis  EGD negative, improved with thyme tea         CLL (chronic lymphocytic leukemia) (Multi)  Indolent, followed by oncology every 6 months recommended expectant management for now         Onychomycosis  Followed by podiatry.           Follow-up 3 months

## 2024-09-04 NOTE — ASSESSMENT & PLAN NOTE
CAC 2 in 2019, dyslipidemia and family history of ASCVD  Discussed recommendation for lipid-lowering therapy and she is amenable.  Will start rosuvastatin 5 mg nightly, potential side effects and management expectations reviewed.

## 2024-09-04 NOTE — ASSESSMENT & PLAN NOTE
Indolent, followed by oncology every 6 months recommended expectant management for now          Mucosal Advancement Flap Text: Given the location of the defect, shape of the defect and the proximity to free margins a mucosal advancement flap was deemed most appropriate. Incisions were made with a 15 blade scalpel in the appropriate fashion along the cutaneous vermillion border and the mucosal lip. The remaining actinically damaged mucosal tissue was excised.  The mucosal advancement flap was then elevated to the gingival sulcus with care taken to preserve the neurovascular structures and advanced into the primary defect. Care was taken to ensure that precise realignment of the vermillion border was achieved.

## 2024-09-04 NOTE — ASSESSMENT & PLAN NOTE
Uncontrolled despite olmesartan 20 mg, routinely checks home blood pressure monitoring  Will add amlodipine.  Potential side effects including lower extremity edema reviewed.  Continue home blood pressure monitoring goal 120s over 70s.    Orders:    amlodipine-olmesartan (Clarisse) 5-20 mg tablet; Take 1 tablet by mouth once daily.    Urinalysis with Reflex Microscopic; Future    Albumin-Creatinine Ratio, Urine Random; Future

## 2024-09-05 DIAGNOSIS — E78.49 OTHER HYPERLIPIDEMIA: Primary | ICD-10-CM

## 2024-09-05 RX ORDER — ROSUVASTATIN CALCIUM 5 MG/1
5 TABLET, COATED ORAL DAILY
Qty: 90 TABLET | Refills: 3 | Status: SHIPPED | OUTPATIENT
Start: 2024-09-05 | End: 2025-09-05

## 2024-09-06 DIAGNOSIS — Z78.0 MENOPAUSE: ICD-10-CM

## 2024-09-06 DIAGNOSIS — M81.0 POSTMENOPAUSAL BONE LOSS: Primary | ICD-10-CM

## 2024-09-09 DIAGNOSIS — M17.11 OSTEOARTHRITIS OF RIGHT KNEE, UNSPECIFIED OSTEOARTHRITIS TYPE: Primary | ICD-10-CM

## 2024-09-20 ENCOUNTER — HOSPITAL ENCOUNTER (OUTPATIENT)
Dept: RADIOLOGY | Facility: CLINIC | Age: 64
Discharge: HOME | End: 2024-09-20
Payer: COMMERCIAL

## 2024-09-20 VITALS — WEIGHT: 186.07 LBS | BODY MASS INDEX: 29.9 KG/M2 | HEIGHT: 66 IN

## 2024-09-20 DIAGNOSIS — Z12.31 ENCOUNTER FOR SCREENING MAMMOGRAM FOR MALIGNANT NEOPLASM OF BREAST: ICD-10-CM

## 2024-09-20 PROCEDURE — 77067 SCR MAMMO BI INCL CAD: CPT

## 2024-09-24 ENCOUNTER — EVALUATION (OUTPATIENT)
Dept: PHYSICAL THERAPY | Facility: CLINIC | Age: 64
End: 2024-09-24
Payer: COMMERCIAL

## 2024-09-24 DIAGNOSIS — R32 URINARY INCONTINENCE, UNSPECIFIED TYPE: ICD-10-CM

## 2024-09-24 DIAGNOSIS — R29.3 ABNORMAL POSTURE: Primary | ICD-10-CM

## 2024-09-24 DIAGNOSIS — M62.81 MUSCLE WEAKNESS: ICD-10-CM

## 2024-09-24 PROCEDURE — 97535 SELF CARE MNGMENT TRAINING: CPT | Mod: GP | Performed by: PHYSICAL THERAPIST

## 2024-09-24 PROCEDURE — 97162 PT EVAL MOD COMPLEX 30 MIN: CPT | Mod: GP | Performed by: PHYSICAL THERAPIST

## 2024-09-24 NOTE — PROGRESS NOTES
Physical Therapy  Physical Therapy Pelvic Floor Evaluation    Name: Priya Izaguirre  MRN: [id number]  : 1960  Encounter Date: 2024  Visit Count: 1     Time Calculation  Start Time: 1120  Stop Time: 1230  Time Calculation (min): 70 min    Insurance: Rebecca LEO, no auth  Visit # 1 of 30 for     Current Problem:  1. Abnormal posture  Follow Up In Physical Therapy      2. Urinary incontinence, unspecified type  Referral to Physical Therapy    Follow Up In Physical Therapy      3. Muscle weakness  Follow Up In Physical Therapy          General  Reason for Referral: urinary incontinence  Referred By: Mercedes Aquino Fall Risk Score (The score of 4 or more indicates an increased risk of falling): 0  Vital Signs     Pain       Subjective  Chief Complaint: urinary incontinence and urgency/frequency  - wearing a pad everyday  - strong urge and leaking (intermittent, not even 1x/month)  - frequency and underlying urge  - increases with standing  - menopause 10 years ago, fibroids came back (more on lower L side)  - gained 15# over the last 2-3 years (following bike training - stopped exercising)  Onset: the last few years  LARISA: unknown    Current Condition: worse    PAIN  Intensity (0-10): 0-10  Location: R knee pain (difficulty straightening) - hx: meniscus sx   Description: aching, tightness    Aggravating Factors:  standing  Relieving Factors: lying down, voiding    Relevant Information (PMH & Previous Tests/Imaging): see chart  Previous Interventions/Treatments: none    Prior Level of Function (PLOF)  Exercise/Physical Activity: 2 hr hike, aquatics classes  Work/School: retired - IT     Treatment Goals: better control of bladder, what's the source - information, kick start exercise program    Cultural or Spiritual Practices: no  History of Trauma: no  Safe at Home: yes    OB/GYN HISTORY:   Pregnancies (): 0  Prolapse? No  Painful [place] or vaginal  penetration? No    BLADDER  Frequency of Urination  Daytime:  4-5x (feels she could go again)  Nighttime: 1-2x  Screening = Blood in urine? no Painful urination? no Recurrent infections/UTIs? No  Other Symptoms   Trouble initiating urine stream    Urine stream is intermittent or slow   X Trouble emptying bladder completely    Dribbling after urination   X Straining or pushing to empty    Trouble feeling bladder urge/fullness   X Trouble holding urine when you get an urge   Urinary Incontinence/Leakage  Present with physical activity and/or exertion? Water aerobics and hiking  How much urine do you leak? Dribble  Do you wear any barriers, such as pantishields or pads? Yes  Average Fluid Intake (glasses/day): 64-80 fl oz of water, time tea in evening, iced tea, carbonated drinks (on occasion), protein powder - soy milk    BOWEL  Frequency of Bowel Movements: good, everyday  Average Fiber Intake (per day): fruits, veggies, whole grains      Objective  Patient was educated on pelvic floor anatomy, function, and dysfunction.   Patient was educated on an orthopedic assessment & external pelvic floor assessment technique and verbalized consent.   The patient is aware they have full autonomy and can stop the assessment at any time.     Standing Assessment:   Posture: forward head, rounded shoulders  SL Stance: increased sway and hip drop on R  DL Squat: decreased weight bearing on R and decreased depth  Gait: decreased heel strike and stance time on R, decreased hip extension bilaterally  Standing Lumbar Mobility: mild limitation Sbing    Supine Movement Assessment   SLR: doming and pelvic rocking  Bridge: doming and lumbar extension  Hip PROM: wnl  MMT: sup hip: 4+/5 bilaterally    Supine Mobility Assessment  - Hamstrings: wnl  - Piriformis: wnl    Diaphragmatic Breathing: wnl  Rib Palpation/Positioning: flared  Assess Abdomen  Transverse Abdominus Contraction: full activation + co contraction + breath holding    OUTCOMES  MEASURE:  Kelly Pelvic Dysfunction Screening Tool : positive    NIH-CPSI  -Pain: 0  - Urinary Symp: 5  - QOL: 9    Goals:   Active       PT Problem       PT Goal 1       Start:  09/24/24    Expected End:  12/23/24       Patient will return to prior level of function with minimal to no pain and without limitations for participation in ADLs, health, and exercise.   Patient demonstrate home exercise program adherence to supplement symptom reduction and functional gains made in clinic  Patient will reports minimal to no pain for participation in ADLs and return to PLOF.   Patient will demonstrate coordination of pelvic floor, strength & relaxation wnl for return to ADLs and PLOF without restriction.   Pt will demonstrate improvement on the outcome measure score to demonstrate overall improved functional abilities and quality of life.              Education: home exercise program, plan of care, activity modifications, pain management, and injury pathology  Bladder Habits: Just in Case Pee, Hover over the toilet, relax & breathe, squatty potty use  Hydration Recommendation: 50% of your body wt (pounds) in fluid ounces  Bladder Irritants: caffeine, artificial sweeteners, carbonated beverages, alcohol  Fiber Intake Recommendation: 25-30g/day    Treatments: education  - healthy bladder norms  - bladder diary (worksheet provided)  - bladder control and function  - hydration/fluid intake  - bladder irritants  - bowel regularity and fiber intake    Plan for Next Visit:   - urge suppression & bladder triggers  - build piliates based deep core/PF strengthening program    Assessment: Patient presents with signs and symptoms consistent with urinary urgency and incontinence, resulting in limited participation in pain-free ADLs and inability to perform at their prior level of function. Pt would benefit from physical therapy to address the impairments found & listed previously in the objective section in order to return to safe and  pain-free ADLs and prior level of function.    Plan:   Planned Interventions include: therapeutic exercise, self-care home management, manual therapy, therapeutic activities, gait training, neuromuscular coordination, aquatic therapy  Patient and/or family understands and agrees with goals and plan documented: yes  Frequency: 2x/month  Duration: 2-4 months     Shyanne Leyva, PT

## 2024-09-26 ENCOUNTER — TELEMEDICINE (OUTPATIENT)
Dept: PRIMARY CARE | Facility: CLINIC | Age: 64
End: 2024-09-26
Payer: COMMERCIAL

## 2024-09-26 DIAGNOSIS — I10 PRIMARY HYPERTENSION: Primary | ICD-10-CM

## 2024-09-26 PROCEDURE — 99441 PR PHYS/QHP TELEPHONE EVALUATION 5-10 MIN: CPT | Performed by: INTERNAL MEDICINE

## 2024-09-26 RX ORDER — AMLODIPINE BESYLATE 2.5 MG/1
2.5 TABLET ORAL DAILY
Qty: 30 TABLET | Refills: 1 | Status: SHIPPED | OUTPATIENT
Start: 2024-09-26 | End: 2025-03-25

## 2024-09-26 RX ORDER — OLMESARTAN MEDOXOMIL 40 MG/1
40 TABLET ORAL DAILY
Qty: 30 TABLET | Refills: 1 | Status: SHIPPED | OUTPATIENT
Start: 2024-09-26 | End: 2024-11-25

## 2024-09-26 NOTE — PROGRESS NOTES
Subjective   Patient ID: Priya Izaguirre is a 64 y.o. female who presents for No chief complaint on file..  HPI  64-year-old female here for follow-up visit, last seen earlier this month for management of blood pressure at which point we added amlodipine to olmesartan.  She reports that this resulted in lower extremity edema and fatigue and wishes to discontinue.    9/24: LDL 140s otherwise labs good, white count remains higher  Severe oa of knee refer to ortho can take ibuprofen.    Signed for PT referral    - HLD - CAC 2 in 2019, family history of ASCVD, elevated APO B, LP(a) low 1, started rosuvastatin 5mg at last visit.   - HTN - on olmesartan 20, previously on thiazide intolerant. Measuring home blood pressure readings which are largely uncontrolled.  She was previously on hydrochlorothiazide which caused excess urination and dehydration. She did order some compression socks.     Past medical history  - Indolent low grade CLL - newly diagnosed seen by Dr. Keys in April recommended continue watchful waiting followed every 6 months. Participating in trial for pneumonia vaccine, has has had 2 vaccinations thus far with 20 and 23 vaccinations at [facility]   - GERD and hiatal hernia - on famotidine prn EGD 12/23  - Pulmonary nodules - completed 2-year follow-up  - Uterine fibroids - history of myomectomy 2001   - Right meniscus repair -  in 2011, now with worsening right knee pain. Sprained her ankle 11/22 with residual discomfort now worsening her knee pain, notes to have difficulty straightening her leg and notes her knee turned in, affecting her sleep has been progressively worsening all year. Has not yet seen orthopedics, has since stopped the elliptical.   - Left congenital hearing loss - gets tested every 10 years declined hearing aid.  - Oneil's neuroma-seen by podiatry last year    Social;   - Lives at home with dog.   - 3 sisters and a brother  - No tobacco, alcohol or drugs  - Retired - former project  manager.   Current Outpatient Medications   Medication Instructions    amlodipine-olmesartan (Clarisse) 5-20 mg tablet 1 tablet, oral, Daily    cholecalciferol (VITAMIN D-3) 4,000 Units, oral, Daily    olmesartan (BENIcar) 20 mg tablet TAKE 1 TABLET BY MOUTH EVERY DAY    rosuvastatin (CRESTOR) 5 mg, oral, Daily    sodium fluoride-pot nitrate 1.1-5 % paste USE AS DIRECTED        Objective     There were no vitals taken for this visit.    Physical Exam  Telephone visit  Assessment/Plan     Assessment & Plan  Primary hypertension  On amlodipine 5 and olmesartan 20 mg with adverse effect amlodipine including uncomfortable pedal edema and fatigue.  Has had adverse reaction to hydrochlorothiazide including dehydration in the past and is hesitant to initiate an alternate agent such as this.  For now, elected to reduce amlodipine 2.5 mg and up titration of olmesartan to 40 mg and monitor to see if symptoms improve.  Advised continued home blood pressure monitoring and will notify me if they remain uncontrolled  Discussed consideration for spironolactone and this may be used in future should blood pressure readings remain uncontrolled  Orders:    amLODIPine (Norvasc) 2.5 mg tablet; Take 1 tablet (2.5 mg) by mouth once daily.    olmesartan (BENIcar) 40 mg tablet; Take 1 tablet (40 mg) by mouth once daily.      I performed this visit using realtime telehealth tools, including an audio/video OR telephone connection between  Priya Izaguirre and myself, Dr. Mercedes Aquino.

## 2024-09-26 NOTE — ASSESSMENT & PLAN NOTE
On amlodipine 5 and olmesartan 20 mg with adverse effect amlodipine including uncomfortable pedal edema and fatigue.  Has had adverse reaction to hydrochlorothiazide including dehydration in the past and is hesitant to initiate an alternate agent such as this.  For now, elected to reduce amlodipine 2.5 mg and up titration of olmesartan to 40 mg and monitor to see if symptoms improve.  Advised continued home blood pressure monitoring and will notify me if they remain uncontrolled  Discussed consideration for spironolactone and this may be used in future should blood pressure readings remain uncontrolled  Orders:    amLODIPine (Norvasc) 2.5 mg tablet; Take 1 tablet (2.5 mg) by mouth once daily.    olmesartan (BENIcar) 40 mg tablet; Take 1 tablet (40 mg) by mouth once daily.

## 2024-10-01 ENCOUNTER — APPOINTMENT (OUTPATIENT)
Dept: ORTHOPEDIC SURGERY | Facility: CLINIC | Age: 64
End: 2024-10-01
Payer: COMMERCIAL

## 2024-10-02 ENCOUNTER — APPOINTMENT (OUTPATIENT)
Dept: PODIATRY | Facility: CLINIC | Age: 64
End: 2024-10-02
Payer: COMMERCIAL

## 2024-10-15 ENCOUNTER — APPOINTMENT (OUTPATIENT)
Dept: HEMATOLOGY/ONCOLOGY | Facility: CLINIC | Age: 64
End: 2024-10-15
Payer: COMMERCIAL

## 2024-10-22 ENCOUNTER — LAB (OUTPATIENT)
Dept: LAB | Facility: LAB | Age: 64
End: 2024-10-22
Payer: COMMERCIAL

## 2024-10-22 ENCOUNTER — APPOINTMENT (OUTPATIENT)
Dept: HEMATOLOGY/ONCOLOGY | Facility: CLINIC | Age: 64
End: 2024-10-22
Payer: COMMERCIAL

## 2024-10-22 DIAGNOSIS — C91.10 CLL (CHRONIC LYMPHOCYTIC LEUKEMIA) (MULTI): Primary | ICD-10-CM

## 2024-10-22 DIAGNOSIS — C91.10 CLL (CHRONIC LYMPHOCYTIC LEUKEMIA) (MULTI): ICD-10-CM

## 2024-10-22 LAB
ALBUMIN SERPL BCP-MCNC: 4.1 G/DL (ref 3.4–5)
ALP SERPL-CCNC: 125 U/L (ref 33–136)
ALT SERPL W P-5'-P-CCNC: 21 U/L (ref 7–45)
ANION GAP SERPL CALC-SCNC: 12 MMOL/L (ref 10–20)
AST SERPL W P-5'-P-CCNC: 18 U/L (ref 9–39)
BASOPHILS # BLD AUTO: 0.09 X10*3/UL (ref 0–0.1)
BASOPHILS NFR BLD AUTO: 0.5 %
BILIRUB SERPL-MCNC: 0.7 MG/DL (ref 0–1.2)
BUN SERPL-MCNC: 15 MG/DL (ref 6–23)
CALCIUM SERPL-MCNC: 8.7 MG/DL (ref 8.6–10.3)
CHLORIDE SERPL-SCNC: 106 MMOL/L (ref 98–107)
CO2 SERPL-SCNC: 26 MMOL/L (ref 21–32)
CREAT SERPL-MCNC: 0.76 MG/DL (ref 0.5–1.05)
EGFRCR SERPLBLD CKD-EPI 2021: 89 ML/MIN/1.73M*2
EOSINOPHIL # BLD AUTO: 0.14 X10*3/UL (ref 0–0.7)
EOSINOPHIL NFR BLD AUTO: 0.8 %
ERYTHROCYTE [DISTWIDTH] IN BLOOD BY AUTOMATED COUNT: 12.3 % (ref 11.5–14.5)
GLUCOSE SERPL-MCNC: 91 MG/DL (ref 74–99)
HCT VFR BLD AUTO: 43.2 % (ref 36–46)
HGB BLD-MCNC: 14 G/DL (ref 12–16)
IMM GRANULOCYTES # BLD AUTO: 0.03 X10*3/UL (ref 0–0.7)
IMM GRANULOCYTES NFR BLD AUTO: 0.2 % (ref 0–0.9)
LDH SERPL L TO P-CCNC: 142 U/L (ref 84–246)
LYMPHOCYTES # BLD AUTO: 12.86 X10*3/UL (ref 1.2–4.8)
LYMPHOCYTES NFR BLD AUTO: 77.1 %
MCH RBC QN AUTO: 31.7 PG (ref 26–34)
MCHC RBC AUTO-ENTMCNC: 32.4 G/DL (ref 32–36)
MCV RBC AUTO: 98 FL (ref 80–100)
MONOCYTES # BLD AUTO: 0.44 X10*3/UL (ref 0.1–1)
MONOCYTES NFR BLD AUTO: 2.6 %
NEUTROPHILS # BLD AUTO: 3.11 X10*3/UL (ref 1.2–7.7)
NEUTROPHILS NFR BLD AUTO: 18.8 %
NRBC BLD-RTO: 0 /100 WBCS (ref 0–0)
PLATELET # BLD AUTO: 292 X10*3/UL (ref 150–450)
POTASSIUM SERPL-SCNC: 4 MMOL/L (ref 3.5–5.3)
PROT SERPL-MCNC: 6.4 G/DL (ref 6.4–8.2)
RBC # BLD AUTO: 4.42 X10*6/UL (ref 4–5.2)
SODIUM SERPL-SCNC: 140 MMOL/L (ref 136–145)
WBC # BLD AUTO: 16.7 X10*3/UL (ref 4.4–11.3)

## 2024-10-22 PROCEDURE — 85025 COMPLETE CBC W/AUTO DIFF WBC: CPT

## 2024-10-22 PROCEDURE — 36415 COLL VENOUS BLD VENIPUNCTURE: CPT

## 2024-10-22 PROCEDURE — 80053 COMPREHEN METABOLIC PANEL: CPT

## 2024-10-22 PROCEDURE — 83615 LACTATE (LD) (LDH) ENZYME: CPT

## 2024-10-24 ENCOUNTER — TREATMENT (OUTPATIENT)
Dept: PHYSICAL THERAPY | Facility: CLINIC | Age: 64
End: 2024-10-24
Payer: COMMERCIAL

## 2024-10-24 DIAGNOSIS — M62.81 MUSCLE WEAKNESS: ICD-10-CM

## 2024-10-24 DIAGNOSIS — R29.3 ABNORMAL POSTURE: ICD-10-CM

## 2024-10-24 DIAGNOSIS — R32 URINARY INCONTINENCE, UNSPECIFIED TYPE: Primary | ICD-10-CM

## 2024-10-24 PROCEDURE — 97112 NEUROMUSCULAR REEDUCATION: CPT | Mod: GP | Performed by: PHYSICAL THERAPIST

## 2024-10-24 PROCEDURE — 97110 THERAPEUTIC EXERCISES: CPT | Mod: GP | Performed by: PHYSICAL THERAPIST

## 2024-10-24 PROCEDURE — 97535 SELF CARE MNGMENT TRAINING: CPT | Mod: GP | Performed by: PHYSICAL THERAPIST

## 2024-10-24 NOTE — PROGRESS NOTES
Physical Therapy  Physical Therapy Pelvic Floor    Name: Priya Izaguirre  MRN: [id number]  : 1960  Encounter Date: 10/24/2024  Visit Count: 2          Insurance: [employer], no auth  Visit # 2 of 30 for     Current Problem:  1. Urinary incontinence, unspecified type        2. Abnormal posture        3. Muscle weakness              General     Precautions     Vital Signs     Pain       Subjective  Chief Complaint: urinary incontinence and urgency/frequency  - wearing a pad everyday  - strong urge and leaking (intermittent, not even 1x/month)  - frequency and underlying urge  - increases with standing  - menopause 10 years ago, fibroids came back (more on lower L side)  - gained 15# over the last 2-3 years (following bike training - stopped exercising)  Onset: the last few years  LARISA: unknown    Today:   - completed bladder log    PAIN  Intensity (0-10): 0-10  Location: R knee pain (difficulty straightening) - hx: meniscus sx   Description: aching, tightness    Prior Level of Function (PLOF)  Exercise/Physical Activity: 2 hr hike, aquatics classes  Work/School: retired - [employer]     Treatment Goals: better control of bladder, what's the source - information, kick start exercise program    BLADDER: symptoms of PFM-O, stress incontinence (exercise), over-hydration, nocturia      Objective  Posture: forward head, rounded shoulders  SL Stance: increased sway and hip drop on R  DL Squat: decreased weight bearing on R and decreased depth  Gait: decreased heel strike and stance time on R, decreased hip extension bilaterally  Standing Lumbar Mobility: mild limitation Sbing  SLR: doming and pelvic rocking  Bridge: doming and lumbar extension  Hip PROM: wnl  MMT: sup hip: 4+/5 bilaterally  - Hamstrings: wnl  - Piriformis: wnl  Diaphragmatic Breathing: wnl  Rib Palpation/Positioning: flared  Assess Abdomen  Transverse Abdominus Contraction: full activation + co contraction + breath  holding    Treatments:   - healthy bladder norms  - bladder diary (worksheet provided)  - bladder control and function  - hydration/fluid intake  - bladder irritants  - bowel regularity and fiber intake  - bladder triggers  - Urge Suppression    Access Code: [email or web address]  - Supine Pelvic Tilt  - 3-5 x weekly - 10 reps  - Hooklying Transversus Abdominis Palpation  - 3-5 x weekly - 5-10 reps - 2 breaths hold  - Supine March with Alternating Leg Lifts  - 3-5 x weekly - 3 sets - 10 reps  - Supine Hip Adduction Isometric with Ball  - 3-5 x weekly - 3 sets - 10 reps  - Hooklying Clamshell with Resistance  - 3-5 x weekly - 3 sets - 10 reps  - Bridge  - 3-5 x weekly - 3 sets - 10 reps  - Straight Leg Raise  - 3-5 x weekly - 3 sets - 10 reps  - Sidelying Hip Abduction  - 3-5 x weekly - 3 sets - 10 reps  - Beginner Prone Single Leg Raise  - 3-5 x weekly - 3 sets - 10 reps    Plan for Next Visit:   - urge suppression & bladder triggers  - build piliates based deep core/PF strengthening program    Assessment: Patient presents with signs and symptoms consistent with urinary urgency and incontinence, resulting in limited participation in pain-free ADLs and inability to perform at their prior level of function. Pt would benefit from physical therapy to address the impairments found & listed previously in the objective section in order to return to safe and pain-free ADLs and prior level of function.  - tolerated exercises well without increased pain or symptoms    Plan:   Planned Interventions include: therapeutic exercise, self-care home management, manual therapy, therapeutic activities, gait training, neuromuscular coordination, aquatic therapy  Patient and/or family understands and agrees with goals and plan documented: yes  Frequency: 2x/month  Duration: 2-4 months     Shyanne Leyva, PT

## 2024-10-29 ENCOUNTER — APPOINTMENT (OUTPATIENT)
Dept: HEMATOLOGY/ONCOLOGY | Facility: CLINIC | Age: 64
End: 2024-10-29
Payer: COMMERCIAL

## 2024-10-30 ENCOUNTER — HOSPITAL ENCOUNTER (OUTPATIENT)
Dept: RADIOLOGY | Facility: CLINIC | Age: 64
Discharge: HOME | End: 2024-10-30
Payer: COMMERCIAL

## 2024-10-30 DIAGNOSIS — Z78.0 MENOPAUSE: ICD-10-CM

## 2024-10-30 DIAGNOSIS — M81.0 POSTMENOPAUSAL BONE LOSS: ICD-10-CM

## 2024-10-30 PROCEDURE — 77080 DXA BONE DENSITY AXIAL: CPT

## 2024-10-30 PROCEDURE — 77080 DXA BONE DENSITY AXIAL: CPT | Performed by: RADIOLOGY

## 2024-10-31 ENCOUNTER — OFFICE VISIT (OUTPATIENT)
Dept: ORTHOPEDIC SURGERY | Facility: CLINIC | Age: 64
End: 2024-10-31
Payer: COMMERCIAL

## 2024-10-31 DIAGNOSIS — M17.11 PRIMARY OSTEOARTHRITIS OF RIGHT KNEE: Primary | ICD-10-CM

## 2024-10-31 DIAGNOSIS — M17.11 OSTEOARTHRITIS OF RIGHT KNEE, UNSPECIFIED OSTEOARTHRITIS TYPE: ICD-10-CM

## 2024-10-31 PROCEDURE — 99213 OFFICE O/P EST LOW 20 MIN: CPT | Mod: 25 | Performed by: ORTHOPAEDIC SURGERY

## 2024-10-31 PROCEDURE — 2500000004 HC RX 250 GENERAL PHARMACY W/ HCPCS (ALT 636 FOR OP/ED): Performed by: ORTHOPAEDIC SURGERY

## 2024-10-31 PROCEDURE — 99203 OFFICE O/P NEW LOW 30 MIN: CPT | Performed by: ORTHOPAEDIC SURGERY

## 2024-10-31 PROCEDURE — 20610 DRAIN/INJ JOINT/BURSA W/O US: CPT | Mod: RT | Performed by: ORTHOPAEDIC SURGERY

## 2024-11-01 ENCOUNTER — APPOINTMENT (OUTPATIENT)
Dept: HEMATOLOGY/ONCOLOGY | Facility: CLINIC | Age: 64
End: 2024-11-01
Payer: COMMERCIAL

## 2024-11-07 ENCOUNTER — APPOINTMENT (OUTPATIENT)
Dept: PHYSICAL THERAPY | Facility: CLINIC | Age: 64
End: 2024-11-07
Payer: COMMERCIAL

## 2024-11-17 PROBLEM — M17.11 PRIMARY OSTEOARTHRITIS OF RIGHT KNEE: Status: ACTIVE | Noted: 2024-11-17

## 2024-11-17 PROCEDURE — 2500000004 HC RX 250 GENERAL PHARMACY W/ HCPCS (ALT 636 FOR OP/ED): Performed by: ORTHOPAEDIC SURGERY

## 2024-11-17 PROCEDURE — 20610 DRAIN/INJ JOINT/BURSA W/O US: CPT | Mod: RT | Performed by: ORTHOPAEDIC SURGERY

## 2024-11-17 RX ORDER — LIDOCAINE HYDROCHLORIDE 10 MG/ML
4 INJECTION, SOLUTION INFILTRATION; PERINEURAL
Status: COMPLETED | OUTPATIENT
Start: 2024-11-17 | End: 2024-11-17

## 2024-11-17 RX ORDER — TRIAMCINOLONE ACETONIDE 40 MG/ML
1 INJECTION, SUSPENSION INTRA-ARTICULAR; INTRAMUSCULAR
Status: COMPLETED | OUTPATIENT
Start: 2024-11-17 | End: 2024-11-17

## 2024-11-17 RX ADMIN — LIDOCAINE HYDROCHLORIDE 4 ML: 10 INJECTION, SOLUTION INFILTRATION; PERINEURAL at 10:37

## 2024-11-17 RX ADMIN — TRIAMCINOLONE ACETONIDE 1 ML: 40 INJECTION, SUSPENSION INTRA-ARTICULAR; INTRAMUSCULAR at 10:37

## 2024-11-17 NOTE — PROGRESS NOTES
Patient is a 64-year-old female presents today for evaluation of right knee pain.  She has a history of a previous arthroscopy in 2014 with Dr. Stevenson.  She tried Advil and Tylenol.  She is never had any injections.  She does have a history of a nickel allergy.  She was referred by Dr. Aquino    Right knee:  AAOx3, NAD, walks with a [degree] antalgic gait  valgus allignment  Range of motion lacks 5 degrees of full extension and flexes to 115 degrees  Stable to varus/valgus/anterior/posterior stress through out the range of motion  Slight laxity with valgus stress  Diffuse lateral joint line tenderness to palpation  Moderate effusion  SILT in a mitch/saph/per/tib distribution  5/5 knee extension/df/pf/ehl  ½ dorsalis pedis and posterior tibial pulse  no popliteal lymphadenopathy  no other overlying lesions  mood: euthymic  Respirations non labored    Plain films were reviewed by myself in clinic today.  She has moderate to advanced osteoarthritis of her right knee with significant lateral joint space narrowing, underlying sclerosis and some osteophytic change.    We discussed conservative treatment today in clinic.  She elected undergo a cortisone injection in clinic today.  Will see her back in 3 months and see how she is doing.  She may benefit from total knee replacement in the future.  All of her questions were answered.    L Inj/Asp: R knee on 11/17/2024 10:37 AM  Indications: pain and joint swelling  Details: 22 G needle, anterolateral approach  Medications: 1 mL triamcinolone acetonide 40 mg/mL; 4 mL lidocaine 10 mg/mL (1 %)  Outcome: tolerated well, no immediate complications    Discussion:  I discussed the conservative treatment options for knee osteoarthritis including but not limited to physical therapy, oral NSAIDS, activity and lifestyle modification, and corticosteroid injections. Pt has elected to undergo a cortisone injection today. I have explained the risk and benefits of an injection including  the possibility of joint infection, bleeding, damage to cartilage, allergic reaction. Patient verbalized understanding and gave verbal consent wishes to proceed with a intra-articular cortisone injection for their knee.    Procedure:  After discussing the risk and benefits of the procedure, we proceeded with an intra-articular right knee injection.    With the patient's informed verbal consent, the right knee was prepped in standard sterile fashion with Chlorhexidine. The skin was then anesthetized with ethyl chloride spray and cleaned again with Chlorhexidine. The knee was then apirated/injected with a prefilled 20-gauge syringe of 40 mg Kenalog + 4 ml Lidocaine using the lateral approach without complications.  The patient tolerated this well and felt immediate initial relief of symptoms. A bandaid was applied and the patient ambulated out of the clinic on ther own accord without difficulty. Patient was instructed to avoid physical activity for 24-48 hours to prevent the knees from swelling and may ice the knees as tolerated. Patient should contact the office if any signs of of infection appear: redness, fever, chills, drainage, swelling or warmth to the knees.  Pt understands that the injections can be repeated no sooner than 3 months.    Procedure, treatment alternatives, risks and benefits explained, specific risks discussed. Consent was given by the patient. Immediately prior to procedure a time out was called to verify the correct patient, procedure, equipment, support staff and site/side marked as required. Patient was prepped and draped in the usual sterile fashion.       This note was created using voice recognition software and was not corrected for typographical or grammatical errors.

## 2024-11-23 DIAGNOSIS — I10 PRIMARY HYPERTENSION: ICD-10-CM

## 2024-11-27 RX ORDER — AMLODIPINE BESYLATE 2.5 MG/1
2.5 TABLET ORAL DAILY
Qty: 90 TABLET | Refills: 1 | Status: SHIPPED | OUTPATIENT
Start: 2024-11-27

## 2024-11-27 RX ORDER — OLMESARTAN MEDOXOMIL 40 MG/1
40 TABLET ORAL DAILY
Qty: 90 TABLET | Refills: 1 | Status: SHIPPED | OUTPATIENT
Start: 2024-11-27

## 2024-12-10 ENCOUNTER — TREATMENT (OUTPATIENT)
Dept: PHYSICAL THERAPY | Facility: CLINIC | Age: 64
End: 2024-12-10
Payer: COMMERCIAL

## 2024-12-10 DIAGNOSIS — M62.81 MUSCLE WEAKNESS: ICD-10-CM

## 2024-12-10 DIAGNOSIS — R32 URINARY INCONTINENCE, UNSPECIFIED TYPE: ICD-10-CM

## 2024-12-10 DIAGNOSIS — R29.3 ABNORMAL POSTURE: ICD-10-CM

## 2024-12-10 PROCEDURE — 97110 THERAPEUTIC EXERCISES: CPT | Mod: GP | Performed by: PHYSICAL THERAPIST

## 2024-12-10 PROCEDURE — 97535 SELF CARE MNGMENT TRAINING: CPT | Mod: GP | Performed by: PHYSICAL THERAPIST

## 2024-12-10 NOTE — PROGRESS NOTES
Physical Therapy  Physical Therapy Pelvic Floor    Name: Priya Izaguirre  MRN: [id number]  : 1960  Encounter Date: 12/10/2024  Visit Count: 3     Time Calculation  Start Time: 1135  Stop Time: 1205  Time Calculation (min): 30 min    Insurance: [employer], no auth  Visit # 3 of 30 for     Current Problem:  1. Urinary incontinence, unspecified type  Follow Up In Physical Therapy      2. Abnormal posture  Follow Up In Physical Therapy      3. Muscle weakness  Follow Up In Physical Therapy            General     Precautions     Vital Signs     Pain       Subjective  Chief Complaint: urinary incontinence and urgency/frequency  - wearing a pad everyday  - strong urge and leaking (intermittent, not even 1x/month)  - frequency and underlying urge  - increases with standing  - menopause 10 years ago, fibroids came back (more on lower L side)  - gained 15# over the last 2-3 years (following bike training - stopped exercising)  Onset: the last few years  LARISA: unknown    Today:   - hep compliance: yes, inconsistent  - hasn't seen much improvement in symptoms  - conscious about not running to the bathroom    PAIN  Intensity (0-10): 0-10  Location: R knee pain (difficulty straightening) - hx: meniscus sx   Description: aching, tightness    Prior Level of Function (PLOF)  Exercise/Physical Activity: 2 hr hike, aquatics classes  Work/School: retired - [employer]     Treatment Goals: better control of bladder, what's the source - information, kick start exercise program    BLADDER: symptoms of PFM-O, stress incontinence (exercise), over-hydration, nocturia      Objective  Posture: forward head, rounded shoulders  SL Stance: increased sway and hip drop on R  DL Squat: decreased weight bearing on R and decreased depth  Gait: decreased heel strike and stance time on R, decreased hip extension bilaterally  Standing Lumbar Mobility: mild limitation Sbing  SLR: doming and pelvic rocking  Bridge: doming and lumbar  extension  Hip PROM: wnl  MMT: sup hip: 4+/5 bilaterally  - Hamstrings: wnl  - Piriformis: wnl  Diaphragmatic Breathing: wnl  Rib Palpation/Positioning: flared  Assess Abdomen  Transverse Abdominus Contraction: full activation + co contraction + breath holding    Treatments:   - healthy bladder norms  - bladder diary (worksheet provided)  - bladder control and function  - hydration/fluid intake  - bladder irritants  - bowel regularity and fiber intake  - bladder triggers  - Urge Suppression    Kegels: 3x day, 10x5-10sec hold    Access Code: [email or web address]  - Supine Pelvic Tilt  - 3-5 x weekly - 10 reps  - Hooklying Transversus Abdominis Palpation  - 3-5 x weekly - 5-10 reps - 2 breaths hold  - Supine March with Alternating Leg Lifts  - 3-5 x weekly - 3 sets - 10 reps  - Supine Hip Adduction Isometric with Ball  - 3-5 x weekly - 3 sets - 10 reps  - Hooklying Clamshell with Resistance  - 3-5 x weekly - 3 sets - 10 reps  - Bridge  - 3-5 x weekly - 3 sets - 10 reps  - Straight Leg Raise  - 3-5 x weekly - 3 sets - 10 reps  - Sidelying Hip Abduction  - 3-5 x weekly - 3 sets - 10 reps  - Beginner Prone Single Leg Raise  - 3-5 x weekly - 3 sets - 10 reps    STANDING  - Single Leg Stance  - 3-5 x weekly - 3 sets - 30 seconds hold  - Squat with Chair Touch  - 3-5 x weekly - 3 sets - 10 reps  - Heel Toe Raises with Counter Support  - 3-5 x weekly - 3 sets - 10 reps  - Standing Hip Abduction with Counter Support  - 3-5 x weekly - 3 sets - 10 reps  - Standing Hip Extension with Counter Support  - 3-5 x weekly - 3 sets - 10 reps    Plan for Next Visit:   - urge suppression & bladder triggers  - build piliates based deep core/PF strengthening program    Assessment: Patient presents with signs and symptoms consistent with urinary urgency and incontinence, resulting in limited participation in pain-free ADLs and inability to perform at their prior level of function. Pt would benefit from physical therapy to address the impairments found &  listed previously in the objective section in order to return to safe and pain-free ADLs and prior level of function.  - tolerated exercises well without increased pain or symptoms    Plan: will continue hep/exercises independently  Planned Interventions include: therapeutic exercise, self-care home management, manual therapy, therapeutic activities, gait training, neuromuscular coordination, aquatic therapy  Patient and/or family understands and agrees with goals and plan documented: yes  Frequency: 2x/month  Duration: 2-4 months     Shyanne Leyva, PT

## 2024-12-19 ENCOUNTER — LAB (OUTPATIENT)
Dept: LAB | Facility: LAB | Age: 64
End: 2024-12-19
Payer: COMMERCIAL

## 2024-12-19 DIAGNOSIS — C91.10 CLL (CHRONIC LYMPHOCYTIC LEUKEMIA) (MULTI): ICD-10-CM

## 2024-12-19 LAB
ALBUMIN SERPL BCP-MCNC: 4.1 G/DL (ref 3.4–5)
ALP SERPL-CCNC: 128 U/L (ref 33–136)
ALT SERPL W P-5'-P-CCNC: 21 U/L (ref 7–45)
ANION GAP SERPL CALC-SCNC: 11 MMOL/L (ref 10–20)
AST SERPL W P-5'-P-CCNC: 21 U/L (ref 9–39)
BASOPHILS # BLD MANUAL: 0.51 X10*3/UL (ref 0–0.1)
BASOPHILS NFR BLD MANUAL: 3 %
BILIRUB SERPL-MCNC: 0.9 MG/DL (ref 0–1.2)
BUN SERPL-MCNC: 11 MG/DL (ref 6–23)
CALCIUM SERPL-MCNC: 9 MG/DL (ref 8.6–10.3)
CHLORIDE SERPL-SCNC: 107 MMOL/L (ref 98–107)
CO2 SERPL-SCNC: 30 MMOL/L (ref 21–32)
CREAT SERPL-MCNC: 0.79 MG/DL (ref 0.5–1.05)
EGFRCR SERPLBLD CKD-EPI 2021: 85 ML/MIN/1.73M*2
EOSINOPHIL # BLD MANUAL: 0 X10*3/UL (ref 0–0.7)
EOSINOPHIL NFR BLD MANUAL: 0 %
ERYTHROCYTE [DISTWIDTH] IN BLOOD BY AUTOMATED COUNT: 12.3 % (ref 11.5–14.5)
GLUCOSE SERPL-MCNC: 83 MG/DL (ref 74–99)
HCT VFR BLD AUTO: 47.5 % (ref 36–46)
HGB BLD-MCNC: 15.2 G/DL (ref 12–16)
IMM GRANULOCYTES # BLD AUTO: 0.02 X10*3/UL (ref 0–0.7)
IMM GRANULOCYTES NFR BLD AUTO: 0.1 % (ref 0–0.9)
LDH SERPL L TO P-CCNC: 185 U/L (ref 84–246)
LYMPHOCYTES # BLD MANUAL: 9.52 X10*3/UL (ref 1.2–4.8)
LYMPHOCYTES NFR BLD MANUAL: 56 %
MCH RBC QN AUTO: 32.1 PG (ref 26–34)
MCHC RBC AUTO-ENTMCNC: 32 G/DL (ref 32–36)
MCV RBC AUTO: 100 FL (ref 80–100)
MONOCYTES # BLD MANUAL: 1.02 X10*3/UL (ref 0.1–1)
MONOCYTES NFR BLD MANUAL: 6 %
NEUTS SEG # BLD MANUAL: 5.1 X10*3/UL (ref 1.2–7)
NEUTS SEG NFR BLD MANUAL: 30 %
NRBC BLD-RTO: 0 /100 WBCS (ref 0–0)
PATH REVIEW-CBC DIFFERENTIAL: NORMAL
PLATELET # BLD AUTO: 287 X10*3/UL (ref 150–450)
POTASSIUM SERPL-SCNC: 4.7 MMOL/L (ref 3.5–5.3)
PROT SERPL-MCNC: 6.8 G/DL (ref 6.4–8.2)
RBC # BLD AUTO: 4.73 X10*6/UL (ref 4–5.2)
RBC MORPH BLD: ABNORMAL
SODIUM SERPL-SCNC: 143 MMOL/L (ref 136–145)
TOTAL CELLS COUNTED BLD: 100
VARIANT LYMPHS # BLD MANUAL: 0.85 X10*3/UL (ref 0–0.5)
VARIANT LYMPHS NFR BLD: 5 %
WBC # BLD AUTO: 17 X10*3/UL (ref 4.4–11.3)

## 2024-12-19 PROCEDURE — 36415 COLL VENOUS BLD VENIPUNCTURE: CPT

## 2024-12-19 PROCEDURE — 85027 COMPLETE CBC AUTOMATED: CPT

## 2024-12-19 PROCEDURE — 85060 BLOOD SMEAR INTERPRETATION: CPT

## 2024-12-19 PROCEDURE — 83615 LACTATE (LD) (LDH) ENZYME: CPT

## 2024-12-19 PROCEDURE — 80053 COMPREHEN METABOLIC PANEL: CPT

## 2024-12-19 PROCEDURE — 85007 BL SMEAR W/DIFF WBC COUNT: CPT

## 2024-12-25 NOTE — PROGRESS NOTES
"Subjective   Patient ID: Priya Izaguirre is a 64 y.o. female who presents for No chief complaint on file..  HPI  64F here for followup visit, last seen in September.   - HTN - on olmesartan 40, (thiazide - polyuria, dehydration, amlodipine - pedal edema and fatigue) reintroduced low dose amlodipine 2.5mg at last visit, consideration for spironolactone. She notes persistent elevation in blood pressure readings in the 130s/90s. She notes no significant side effects on the 2.5mg dosage. Clarfies that stopped hydrochlorothiazide after 30-40 mile bike ride with dehydration and experienced a \"white out\" had to sit down and hydrate to improve symptoms. Plans to restart biking again in June but not nearly as intent.   - HLD - CAC 2 in 2019, family history of ASCVD, elevated APO B, LP(a) low - on rosuvastatin 5mg. Taking it for 3 weeks resulted in leg cramps almost every other day that would wake her up at night, subsequently discontinued and resolved.     PMHx:  - Indolent low grade CLL - newly diagnosed followed by Dr. Keys, participating in trial for pneumonia vaccine, has has had 2 vaccinations thus far with 20 and 23 vaccinations at [facility]   - GERD and hiatal hernia - on famotidine prn EGD 12/23  - Pulmonary nodules - completed 2-year follow-up  - Uterine fibroids - history of myomectomy 2001   - Right meniscus repair -  in 2011, with residual knee pain exacerbated by strain followed by Dr. Ring s/p recent steroid injection.   - Left congenital hearing loss - gets tested every 10 years declined hearing aid.  - Oneil's neuroma-seen by podiatry last year     Social;   - Lives at home with dog.   - 3 sisters and a brother  - No tobacco, alcohol or drugs  - Retired - former .        Current Outpatient Medications   Medication Instructions    amLODIPine (NORVASC) 2.5 mg, oral, Daily    atorvastatin (LIPITOR) 10 mg, oral, Daily    cholecalciferol (VITAMIN D-3) 4,000 Units, oral, Daily    olmesartan " (BENICAR) 40 mg, oral, Daily    olmesartan-hydrochlorothiazide (BENIcar HCT) 20-12.5 mg tablet 1 tablet, oral, Daily        Objective     There were no vitals taken for this visit.    Physical Exam  General: Alert and oriented, in no apparent distress   HEENT: No conjunctival erythema, no external facial lesions   Lungs: Breathing comfortably  Skin: No evidence of skin breakdown.  Neuro: AAO x 3, answering questions appropriately, no obvious cranial nerve deficits    Lab Results   Component Value Date    WBC 17.0 (H) 12/19/2024    HGB 15.2 12/19/2024    HCT 47.5 (H) 12/19/2024     12/19/2024    CHOL 218 (H) 09/04/2024    TRIG 118 09/04/2024    HDL 52.3 09/04/2024    ALT 21 12/19/2024    AST 21 12/19/2024     12/19/2024    K 4.7 12/19/2024     12/19/2024    CREATININE 0.79 12/19/2024    BUN 11 12/19/2024    CO2 30 12/19/2024    TSH 1.82 09/04/2024    HGBA1C 5.5 09/04/2024     Independently reviewed most recent bloodwork        Assessment/Plan     Assessment & Plan  Primary hypertension  Blood pressure readings remain elevated despite amlodipine 2.5 (low tolerating) and olmesartan 40 mg  Amenable to trial of restarting thiazide at low-dose, added to olmesartan.  Check BMP in 2 weeks time  Potential side effects management expectations reviewed including excess urination, dizziness, hypokalemia.  To take first thing in the morning  Continue home blood pressure monitoring will notify me in 1 month's time      Orders:    olmesartan-hydrochlorothiazide (BENIcar HCT) 20-12.5 mg tablet; Take 1 tablet by mouth once daily.    Basic metabolic panel; Future    Other hyperlipidemia  CAC 2 in 2019, dyslipidemia and family history of ASCVD  Significant cramping related to rosuvastatin discontinued  Trial of atorvastatin 20 mg nightly, check lipid profile 3 months after initiation    Orders:    atorvastatin (Lipitor) 10 mg tablet; Take 1 tablet (10 mg) by mouth once daily.    Lipid Panel; Future      Health  Maintenance  Cancer Screening:  - Colonoscopy 12/22 repeat 5 years   - Mammogram 9/24  - Pap smear - plus HPV 8/22   - Skin - derm 9/24 [facility]   DEXA  10/24 osteopenia low FRAX         I performed this visit using realtime telehealth tools, including an audio/video OR telephone connection between  Priya Izaguirre and myself, Dr. Mercedes Aquino.   The patient expressed consent to use this telemedicine service, understands the limitations of the visit, that they will not be physically examined and all issues may not be able to be addressed.   Virtual or Telephone Consent    An interactive audio and video telecommunication system which permits real time communications between the patient (at the originating site) and provider (at the distant site) was utilized to provide this telehealth service.   Verbal consent was requested and obtained from Priya Izaguirre on this date, 12/26/24 for a telehealth visit.

## 2024-12-26 ENCOUNTER — APPOINTMENT (OUTPATIENT)
Dept: PRIMARY CARE | Facility: CLINIC | Age: 64
End: 2024-12-26
Payer: COMMERCIAL

## 2024-12-26 DIAGNOSIS — E78.49 OTHER HYPERLIPIDEMIA: ICD-10-CM

## 2024-12-26 DIAGNOSIS — I10 PRIMARY HYPERTENSION: Primary | ICD-10-CM

## 2024-12-26 PROCEDURE — 99214 OFFICE O/P EST MOD 30 MIN: CPT | Performed by: INTERNAL MEDICINE

## 2024-12-26 PROCEDURE — 1036F TOBACCO NON-USER: CPT | Performed by: INTERNAL MEDICINE

## 2024-12-26 RX ORDER — OLMESARTAN MEDOXOMIL AND HYDROCHLOROTHIAZIDE 20/12.5 20; 12.5 MG/1; MG/1
1 TABLET ORAL DAILY
Qty: 30 TABLET | Refills: 0 | Status: SHIPPED | OUTPATIENT
Start: 2024-12-26 | End: 2025-12-26

## 2024-12-26 RX ORDER — ATORVASTATIN CALCIUM 10 MG/1
10 TABLET, FILM COATED ORAL DAILY
Qty: 90 TABLET | Refills: 0 | Status: SHIPPED | OUTPATIENT
Start: 2024-12-26 | End: 2025-12-26

## 2024-12-26 NOTE — ASSESSMENT & PLAN NOTE
CAC 2 in 2019, dyslipidemia and family history of ASCVD  Significant cramping related to rosuvastatin discontinued  Trial of atorvastatin 20 mg nightly, check lipid profile 3 months after initiation    Orders:    atorvastatin (Lipitor) 10 mg tablet; Take 1 tablet (10 mg) by mouth once daily.    Lipid Panel; Future

## 2024-12-26 NOTE — ASSESSMENT & PLAN NOTE
Blood pressure readings remain elevated despite amlodipine 2.5 (low tolerating) and olmesartan 40 mg  Amenable to trial of restarting thiazide at low-dose, added to olmesartan.  Check BMP in 2 weeks time  Potential side effects management expectations reviewed including excess urination, dizziness, hypokalemia.  To take first thing in the morning  Continue home blood pressure monitoring will notify me in 1 month's time      Orders:    olmesartan-hydrochlorothiazide (BENIcar HCT) 20-12.5 mg tablet; Take 1 tablet by mouth once daily.    Basic metabolic panel; Future

## 2024-12-26 NOTE — PATIENT INSTRUCTIONS
Priya,   Blood pressure   STOP olmesartan   START olmesartan/hydrochlorothiazide. Take 1 tablet in the morning   Check blood test in 2 weeks to monitor kidney and electrolytes   Continue amlodipine 2.5mg   Cholesterol   If tolerating blood pressure medication, START atorvastatin 10mg at bedtime   Check repeat lipid profile 3 months after starting if tolerating.

## 2025-01-14 ENCOUNTER — TELEPHONE (OUTPATIENT)
Dept: OBSTETRICS AND GYNECOLOGY | Facility: CLINIC | Age: 65
End: 2025-01-14
Payer: COMMERCIAL

## 2025-01-17 ENCOUNTER — LAB (OUTPATIENT)
Dept: LAB | Facility: LAB | Age: 65
End: 2025-01-17
Payer: COMMERCIAL

## 2025-01-17 DIAGNOSIS — I10 PRIMARY HYPERTENSION: ICD-10-CM

## 2025-01-17 LAB
ANION GAP SERPL CALC-SCNC: 13 MMOL/L (ref 10–20)
BUN SERPL-MCNC: 14 MG/DL (ref 6–23)
CALCIUM SERPL-MCNC: 9.8 MG/DL (ref 8.6–10.6)
CHLORIDE SERPL-SCNC: 103 MMOL/L (ref 98–107)
CO2 SERPL-SCNC: 30 MMOL/L (ref 21–32)
CREAT SERPL-MCNC: 0.92 MG/DL (ref 0.5–1.05)
EGFRCR SERPLBLD CKD-EPI 2021: 71 ML/MIN/1.73M*2
GLUCOSE SERPL-MCNC: 91 MG/DL (ref 74–99)
POTASSIUM SERPL-SCNC: 4.3 MMOL/L (ref 3.5–5.3)
SODIUM SERPL-SCNC: 142 MMOL/L (ref 136–145)

## 2025-01-17 PROCEDURE — 80048 BASIC METABOLIC PNL TOTAL CA: CPT

## 2025-01-22 DIAGNOSIS — I10 PRIMARY HYPERTENSION: ICD-10-CM

## 2025-01-22 RX ORDER — AMLODIPINE BESYLATE 2.5 MG/1
2.5 TABLET ORAL DAILY
Qty: 90 TABLET | Refills: 1 | Status: SHIPPED | OUTPATIENT
Start: 2025-01-22

## 2025-01-22 RX ORDER — OLMESARTAN MEDOXOMIL AND HYDROCHLOROTHIAZIDE 20/12.5 20; 12.5 MG/1; MG/1
1 TABLET ORAL DAILY
Qty: 90 TABLET | Refills: 0 | Status: SHIPPED | OUTPATIENT
Start: 2025-01-22 | End: 2026-01-22

## 2025-01-31 ENCOUNTER — APPOINTMENT (OUTPATIENT)
Dept: ORTHOPEDIC SURGERY | Facility: CLINIC | Age: 65
End: 2025-01-31
Payer: COMMERCIAL

## 2025-02-20 DIAGNOSIS — E78.49 OTHER HYPERLIPIDEMIA: ICD-10-CM

## 2025-02-21 RX ORDER — ATORVASTATIN CALCIUM 10 MG/1
10 TABLET, FILM COATED ORAL DAILY
Qty: 90 TABLET | Refills: 3 | Status: SHIPPED | OUTPATIENT
Start: 2025-02-21 | End: 2026-02-21

## 2025-03-13 ENCOUNTER — OFFICE VISIT (OUTPATIENT)
Dept: ORTHOPEDIC SURGERY | Facility: CLINIC | Age: 65
End: 2025-03-13
Payer: COMMERCIAL

## 2025-03-13 DIAGNOSIS — M17.11 PRIMARY OSTEOARTHRITIS OF RIGHT KNEE: Primary | ICD-10-CM

## 2025-03-13 PROCEDURE — 20610 DRAIN/INJ JOINT/BURSA W/O US: CPT | Mod: RT | Performed by: ORTHOPAEDIC SURGERY

## 2025-03-13 NOTE — PROGRESS NOTES
L Inj/Asp: R knee on 3/13/2025 11:12 AM  Indications: pain and joint swelling  Details: 22 G needle, anterolateral approach  Outcome: tolerated well, no immediate complications    Discussion:  I discussed the conservative treatment options for knee osteoarthritis including but not limited to physical therapy, oral NSAIDS, activity and lifestyle modification, and corticosteroid injections. Pt has elected to undergo a cortisone injection today. I have explained the risk and benefits of an injection including the possibility of joint infection, bleeding, damage to cartilage, allergic reaction. Patient verbalized understanding and gave verbal consent wishes to proceed with a intra-articular cortisone injection for their knee.    Procedure:  After discussing the risk and benefits of the procedure, we proceeded with an intra-articular right knee injection.    With the patient's informed verbal consent, the right knee was prepped in standard sterile fashion with Chlorhexidine. The skin was then anesthetized with ethyl chloride spray and cleaned again with Chlorhexidine. The knee was then apirated/injected with a prefilled 20-gauge syringe of 40 mg Kenalog + 4 ml Lidocaine using the lateral approach without complications.  The patient tolerated this well and felt immediate initial relief of symptoms. A bandaid was applied and the patient ambulated out of the clinic on ther own accord without difficulty. Patient was instructed to avoid physical activity for 24-48 hours to prevent the knees from swelling and may ice the knees as tolerated. Patient should contact the office if any signs of of infection appear: redness, fever, chills, drainage, swelling or warmth to the knees.  Pt understands that the injections can be repeated no sooner than 3 months.    Procedure, treatment alternatives, risks and benefits explained, specific risks discussed. Consent was given by the patient. Immediately prior to procedure a time out was  called to verify the correct patient, procedure, equipment, support staff and site/side marked as required. Patient was prepped and draped in the usual sterile fashion.

## 2025-04-30 ENCOUNTER — APPOINTMENT (OUTPATIENT)
Dept: PRIMARY CARE | Facility: CLINIC | Age: 65
End: 2025-04-30
Payer: COMMERCIAL

## 2025-04-30 DIAGNOSIS — E78.49 OTHER HYPERLIPIDEMIA: ICD-10-CM

## 2025-04-30 DIAGNOSIS — I10 PRIMARY HYPERTENSION: Primary | ICD-10-CM

## 2025-04-30 DIAGNOSIS — C91.10 CLL (CHRONIC LYMPHOCYTIC LEUKEMIA) (MULTI): ICD-10-CM

## 2025-04-30 DIAGNOSIS — Z00.00 ENCOUNTER FOR PREVENTATIVE ADULT HEALTH CARE EXAMINATION: ICD-10-CM

## 2025-04-30 PROCEDURE — 99214 OFFICE O/P EST MOD 30 MIN: CPT | Performed by: INTERNAL MEDICINE

## 2025-04-30 RX ORDER — OLMESARTAN MEDOXOMIL AND HYDROCHLOROTHIAZIDE 20/12.5 20; 12.5 MG/1; MG/1
1 TABLET ORAL DAILY
Qty: 90 TABLET | Refills: 3 | Status: SHIPPED | OUTPATIENT
Start: 2025-04-30 | End: 2026-04-30

## 2025-04-30 NOTE — ASSESSMENT & PLAN NOTE
On olmesartan hydrochlorothiazide low dose, overall tolerating well, repeat bmp within normal limits.   Variable readings, some low without symptoms., Hold off on more aggressive management for now, continue periodic home readings.  Is not planning on rigorous exercise regimen as before, but does get more exercise in the summer

## 2025-04-30 NOTE — PROGRESS NOTES
Subjective   Patient ID: Priya Izaguirre is a 64 y.o. female who presents for No chief complaint on file..  History of Present Illness    64-year-old female here for follow-up visit, last seen virtually in December.    PMHx:  - HTN -on amlodipine 2.5 (higher dose edema and fatigue), reintroduce thiazide 12.5 at last visit and olmesartan 40 (thiazide-polyuria, dehydration.  Repeat BMP within normal limits. Has been measuring home blood pressure readings, yesterday had lower readings but improved on recheck. Continues         - HLD - CAC 2 in 2019, family history of ASCVD, elevated APO B, LP(a) low -(rosuvastatin-leg cramps) switch to atorvastatin 20 recommended repeat lipid profile has not yet started.   - Indolent low grade CLL - newly diagnosed followed by Dr. Keys, participating in trial for pneumonia vaccine, has has had 2 vaccinations thus far with 20 and 23 vaccinations at [facility]   - GERD and hiatal hernia - on famotidine prn EGD 12/23  - Pulmonary nodules - completed 2-year follow-up  - Uterine fibroids - history of myomectomy 2001   - Right meniscus repair -  in 2011, with residual knee pain exacerbated by strain followed by Dr. Ring s/p recent steroid injection 3/25   - Left congenital hearing loss - gets tested every 10 years declined hearing aid.  - Oneil's neuroma-seen by podiatry     Social;   - Lives at home with dog.   - 3 sisters and a brother  - No tobacco, alcohol or drugs  - Retired - former .   [employer] in the summer months     PMHx, FHx, Social Hx, Surg Hx personally reviewed at this appointment. No pertinent findings and/or changes from prior (if applicable).      Objective     There were no vitals taken for this visit.   General: Alert and oriented, in no apparent distress   HEENT: No conjunctival erythema, no external facial lesions   Lungs: Breathing comfortably  Skin: No evidence of skin breakdown.  Neuro: AAO x 3, answering questions appropriately, no obvious  cranial nerve deficits       Lab Results   Component Value Date    WBC 17.0 (H) 12/19/2024    HGB 15.2 12/19/2024    HCT 47.5 (H) 12/19/2024     12/19/2024    CHOL 218 (H) 09/04/2024    TRIG 118 09/04/2024    HDL 52.3 09/04/2024    ALT 21 12/19/2024    AST 21 12/19/2024     01/17/2025    K 4.3 01/17/2025     01/17/2025    CREATININE 0.92 01/17/2025    BUN 14 01/17/2025    CO2 30 01/17/2025    TSH 1.82 09/04/2024    HGBA1C 5.5 09/04/2024         Current Outpatient Medications   Medication Instructions    amLODIPine (NORVASC) 2.5 mg, oral, Daily    atorvastatin (LIPITOR) 10 mg, oral, Daily    cholecalciferol (VITAMIN D-3) 4,000 Units, oral, Daily    olmesartan-hydrochlorothiazide (BENIcar HCT) 20-12.5 mg tablet 1 tablet, oral, Daily        XR DEXA bone density  Narrative: Interpreted By:  Stella Castellon,   STUDY:  DEXA BONE [id number]/30/2024 1:16 pm      INDICATION:  Signs/Symptoms:menopausal bone loss. The patient is a 65 y/o  year  old F.      ,M81.0 Age-related osteoporosis without current pathological  fracture,Z78.0 Asymptomatic menopausal state      COMPARISON:  None.  Previous exam 09/15/2021 not used for accurate comparison due  to technical differences.      ACCESSION NUMBER(S):  [id number]      ORDERING CLINICIAN:  RILEY MOORE      TECHNIQUE:  DEXA BONE DENSITY      FINDINGS:  SPINE L1-L4  Bone Mineral Density: 1.080  T-Score -0.8  Z-Score 0.5          LEFT FEMUR -TOTAL  Bone Mineral Density: 0.990  T-Score -0.1   Z-Score  0.9          LEFT FEMUR -NECK  Bone Mineral Density: 0.943  T-Score -0.7  Z-Score 0.6              World Health Organization (WHO) criteria for post-menopausal,   Women:  Normal:         T-score at or above -1 SD  Osteopenia:   T-score between -1 and -2.5 SD  Osteoporosis: T-score at or below -2.5 SD          10-year Fracture Risk:  Major Osteoporotic Fracture  11.7  Hip Fracture                        0.5          This exam was performed at [facility]  Center on a [facility] Lun[facility]  [facility] Dexa Unit.      Impression: DEXA:  According to World Health Organization criteria,  classification is normal.      Followup recommended in 2 years or sooner as clinically warranted.      All images and detailed analysis are available on the  Radiology  PACS.      MACRO:  None      Signed by: Stella Castellon 10/30/2024 1:18 PM  Dictation workstation:   [id number]           Assessment & Plan    Assessment/Plan   Problem List Items Addressed This Visit       Other hyperlipidemia    Current Assessment & Plan   CAC 2 in 2019, dyslipidemia and family history of ASCVD  Significant cramping related to rosuvastatin discontinued  Amenable to now starting atorvastatin  Trial of atorvastatin 20 mg nightly, check lipid profile 3 months after initiation           Primary hypertension - Primary    Current Assessment & Plan   On olmesartan hydrochlorothiazide low dose, overall tolerating well, repeat bmp within normal limits.   Variable readings, some low without symptoms., Hold off on more aggressive management for now, continue periodic home readings.  Is not planning on rigorous exercise regimen as before, but does get more exercise in the summer          Relevant Medications    olmesartan-hydrochlorothiazide (BENIcar HCT) 20-12.5 mg tablet    Other Relevant Orders    Albumin-Creatinine Ratio, Urine Random    CLL (chronic lymphocytic leukemia) (Multi)    Current Assessment & Plan   Indolent, followed by oncology every 6 months recommended expectant management for now            Other Visit Diagnoses         Encounter for preventative adult health care examination        Relevant Orders    CBC and Auto Differential    Comprehensive Metabolic Panel    Hemoglobin A1C    Lipid Panel    TSH with reflex to Free T4 if abnormal    Vitamin D 25-Hydroxy,Total (for eval of Vitamin D levels)                Health maintenance  Cancer screening:  -Colonoscopy 12/22 repeat 5 years  -Mammogram 9/24  ordered for repeat  -Pap smear plus HPV 8/22  - Skin - derm 9/24 [facility]   DEXA 10/24 osteopenia low FRAX    Followup as scheduled for physical, labs prior     Mercedes Aquino, DO         Virtual or Telephone Consent    An interactive audio and video telecommunication system which permits real time communications between the patient (at the originating site) and provider (at the distant site) was utilized to provide this telehealth service.   Verbal consent was requested and obtained from Priya Izaguirre on this date, 04/30/25 for a telehealth visit and the patient's location was confirmed at the time of the visit.

## 2025-04-30 NOTE — ASSESSMENT & PLAN NOTE
CAC 2 in 2019, dyslipidemia and family history of ASCVD  Significant cramping related to rosuvastatin discontinued  Amenable to now starting atorvastatin  Trial of atorvastatin 20 mg nightly, check lipid profile 3 months after initiation

## 2025-06-09 DIAGNOSIS — E78.49 OTHER HYPERLIPIDEMIA: Primary | ICD-10-CM

## 2025-06-09 RX ORDER — PRAVASTATIN SODIUM 10 MG/1
10 TABLET ORAL DAILY
Qty: 100 TABLET | Refills: 0 | Status: SHIPPED | OUTPATIENT
Start: 2025-06-09 | End: 2026-07-14

## 2025-07-20 DIAGNOSIS — I10 PRIMARY HYPERTENSION: ICD-10-CM

## 2025-07-21 RX ORDER — OLMESARTAN MEDOXOMIL AND HYDROCHLOROTHIAZIDE 20/12.5 20; 12.5 MG/1; MG/1
1 TABLET ORAL DAILY
Qty: 90 TABLET | Refills: 1 | Status: SHIPPED | OUTPATIENT
Start: 2025-07-21

## 2025-07-21 RX ORDER — AMLODIPINE BESYLATE 2.5 MG/1
2.5 TABLET ORAL DAILY
Qty: 90 TABLET | Refills: 1 | Status: SHIPPED | OUTPATIENT
Start: 2025-07-21

## 2025-07-27 DIAGNOSIS — I10 PRIMARY HYPERTENSION: ICD-10-CM

## 2025-08-04 ENCOUNTER — PATIENT MESSAGE (OUTPATIENT)
Dept: HEMATOLOGY/ONCOLOGY | Facility: HOSPITAL | Age: 65
End: 2025-08-04
Payer: COMMERCIAL

## 2025-08-04 DIAGNOSIS — C91.10 CLL (CHRONIC LYMPHOCYTIC LEUKEMIA) (MULTI): ICD-10-CM

## 2025-08-04 RX ORDER — AMLODIPINE BESYLATE 2.5 MG/1
2.5 TABLET ORAL DAILY
Qty: 90 TABLET | Refills: 1 | OUTPATIENT
Start: 2025-08-04

## 2025-08-07 ENCOUNTER — OFFICE VISIT (OUTPATIENT)
Dept: ORTHOPEDIC SURGERY | Facility: CLINIC | Age: 65
End: 2025-08-07
Payer: COMMERCIAL

## 2025-08-07 DIAGNOSIS — M17.11 PRIMARY OSTEOARTHRITIS OF RIGHT KNEE: Primary | ICD-10-CM

## 2025-08-07 PROCEDURE — 2500000004 HC RX 250 GENERAL PHARMACY W/ HCPCS (ALT 636 FOR OP/ED): Performed by: ORTHOPAEDIC SURGERY

## 2025-08-07 PROCEDURE — 1036F TOBACCO NON-USER: CPT | Performed by: ORTHOPAEDIC SURGERY

## 2025-08-07 PROCEDURE — 20610 DRAIN/INJ JOINT/BURSA W/O US: CPT | Mod: RT | Performed by: ORTHOPAEDIC SURGERY

## 2025-08-07 RX ORDER — LIDOCAINE HYDROCHLORIDE 10 MG/ML
4 INJECTION, SOLUTION INFILTRATION; PERINEURAL
Status: COMPLETED | OUTPATIENT
Start: 2025-08-07 | End: 2025-08-07

## 2025-08-07 RX ORDER — TRIAMCINOLONE ACETONIDE 40 MG/ML
40 INJECTION, SUSPENSION INTRA-ARTICULAR; INTRAMUSCULAR
Status: COMPLETED | OUTPATIENT
Start: 2025-08-07 | End: 2025-08-07

## 2025-08-07 RX ADMIN — TRIAMCINOLONE ACETONIDE 40 MG: 400 INJECTION, SUSPENSION INTRA-ARTICULAR; INTRAMUSCULAR at 09:59

## 2025-08-07 RX ADMIN — LIDOCAINE HYDROCHLORIDE 4 ML: 10 INJECTION, SOLUTION INFILTRATION; PERINEURAL at 09:59

## 2025-08-07 NOTE — PROGRESS NOTES
L Inj/Asp: R knee on 8/7/2025 9:59 AM  Indications: pain and joint swelling  Details: 22 G needle, anterolateral approach  Medications: 40 mg triamcinolone acetonide 40 mg/mL; 4 mL lidocaine 10 mg/mL (1 %)  Outcome: tolerated well, no immediate complications    Discussion:  I discussed the conservative treatment options for knee osteoarthritis including but not limited to physical therapy, oral NSAIDS, activity and lifestyle modification, and corticosteroid injections. Pt has elected to undergo a cortisone injection today. I have explained the risk and benefits of an injection including the possibility of joint infection, bleeding, damage to cartilage, allergic reaction. Patient verbalized understanding and gave verbal consent wishes to proceed with a intra-articular cortisone injection for their knee.    Procedure:  After discussing the risk and benefits of the procedure, we proceeded with an intra-articular right knee injection.    With the patient's informed verbal consent, the right knee was prepped in standard sterile fashion with Chlorhexidine. The skin was then anesthetized with ethyl chloride spray and cleaned again with Chlorhexidine. The knee was then apirated/injected with a prefilled 20-gauge syringe of 40 mg Kenalog + 4 ml Lidocaine using the lateral approach without complications.  The patient tolerated this well and felt immediate initial relief of symptoms. A bandaid was applied and the patient ambulated out of the clinic on ther own accord without difficulty. Patient was instructed to avoid physical activity for 24-48 hours to prevent the knees from swelling and may ice the knees as tolerated. Patient should contact the office if any signs of of infection appear: redness, fever, chills, drainage, swelling or warmth to the knees.  Pt understands that the injections can be repeated no sooner than 3 months.    Procedure, treatment alternatives, risks and benefits explained, specific risks discussed.  Consent was given by the patient. Immediately prior to procedure a time out was called to verify the correct patient, procedure, equipment, support staff and site/side marked as required. Patient was prepped and draped in the usual sterile fashion.

## 2025-09-16 ENCOUNTER — APPOINTMENT (OUTPATIENT)
Dept: PRIMARY CARE | Facility: CLINIC | Age: 65
End: 2025-09-16
Payer: COMMERCIAL

## 2025-09-17 ENCOUNTER — APPOINTMENT (OUTPATIENT)
Dept: PRIMARY CARE | Facility: CLINIC | Age: 65
End: 2025-09-17
Payer: COMMERCIAL

## 2026-02-02 ENCOUNTER — APPOINTMENT (OUTPATIENT)
Dept: DERMATOLOGY | Facility: HOSPITAL | Age: 66
End: 2026-02-02
Payer: COMMERCIAL